# Patient Record
Sex: FEMALE | URBAN - METROPOLITAN AREA
[De-identification: names, ages, dates, MRNs, and addresses within clinical notes are randomized per-mention and may not be internally consistent; named-entity substitution may affect disease eponyms.]

---

## 2023-06-26 ENCOUNTER — TELEPHONE (OUTPATIENT)
Age: 63
End: 2023-06-26

## 2023-06-26 DIAGNOSIS — E78.49 OTHER HYPERLIPIDEMIA: ICD-10-CM

## 2023-06-26 DIAGNOSIS — F41.1 GAD (GENERALIZED ANXIETY DISORDER): ICD-10-CM

## 2023-06-26 DIAGNOSIS — J44.9 STAGE 3 SEVERE COPD BY GOLD CLASSIFICATION (HCC): ICD-10-CM

## 2023-06-26 DIAGNOSIS — J30.2 SEASONAL ALLERGIES: ICD-10-CM

## 2023-06-26 DIAGNOSIS — G47.09 OTHER INSOMNIA: ICD-10-CM

## 2023-06-26 DIAGNOSIS — Z87.891 EX-SMOKER: ICD-10-CM

## 2023-06-26 DIAGNOSIS — M85.80 OSTEOPENIA DETERMINED BY X-RAY: ICD-10-CM

## 2023-06-26 DIAGNOSIS — M19.041 ARTHRITIS OF RIGHT HAND: ICD-10-CM

## 2023-07-09 PROBLEM — E78.49 OTHER HYPERLIPIDEMIA: Status: ACTIVE | Noted: 2023-07-09

## 2023-07-09 PROBLEM — G47.09 OTHER INSOMNIA: Status: ACTIVE | Noted: 2023-07-09

## 2023-07-09 PROBLEM — M85.80 OSTEOPENIA DETERMINED BY X-RAY: Status: ACTIVE | Noted: 2023-07-09

## 2023-07-09 PROBLEM — Z87.891 EX-SMOKER: Status: ACTIVE | Noted: 2023-07-09

## 2023-07-09 PROBLEM — J30.2 SEASONAL ALLERGIES: Status: ACTIVE | Noted: 2023-07-09

## 2023-07-09 PROBLEM — F41.1 GAD (GENERALIZED ANXIETY DISORDER): Status: ACTIVE | Noted: 2023-07-09

## 2023-07-09 PROBLEM — J44.9 STAGE 3 SEVERE COPD BY GOLD CLASSIFICATION (HCC): Status: ACTIVE | Noted: 2023-07-09

## 2023-07-09 PROBLEM — M19.041 ARTHRITIS OF RIGHT HAND: Status: ACTIVE | Noted: 2023-07-09

## 2023-07-09 RX ORDER — MONTELUKAST SODIUM 10 MG/1
10 TABLET ORAL DAILY
COMMUNITY
Start: 2023-05-08

## 2023-07-09 RX ORDER — ALBUTEROL SULFATE 90 UG/1
2 AEROSOL, METERED RESPIRATORY (INHALATION) 2 TIMES DAILY PRN
COMMUNITY
Start: 2023-05-22

## 2023-07-09 RX ORDER — IBUPROFEN 200 MG
2 CAPSULE ORAL DAILY
COMMUNITY

## 2023-07-09 RX ORDER — ROSUVASTATIN CALCIUM 5 MG/1
5 TABLET, COATED ORAL DAILY
COMMUNITY
Start: 2023-06-18

## 2023-07-09 RX ORDER — CHOLECALCIFEROL (VITAMIN D3) 125 MCG
1 CAPSULE ORAL DAILY
COMMUNITY
Start: 2023-05-19

## 2023-07-09 RX ORDER — BUDESONIDE AND FORMOTEROL FUMARATE DIHYDRATE 160; 4.5 UG/1; UG/1
2 AEROSOL RESPIRATORY (INHALATION) 2 TIMES DAILY
COMMUNITY
Start: 2023-06-14

## 2023-07-09 RX ORDER — DIPHENHYDRAMINE HCL 50 MG
50 CAPSULE ORAL NIGHTLY PRN
COMMUNITY

## 2023-07-09 RX ORDER — CELECOXIB 200 MG/1
200 CAPSULE ORAL DAILY PRN
COMMUNITY
Start: 2023-05-08

## 2023-07-09 RX ORDER — TIOTROPIUM BROMIDE INHALATION SPRAY 3.12 UG/1
SPRAY, METERED RESPIRATORY (INHALATION)
COMMUNITY
Start: 2023-05-24

## 2023-07-13 LAB
CHOLESTEROL, TOTAL: 164 MG/DL
CHOLESTEROL/HDL RATIO: 29
HDLC SERPL-MCNC: 57 MG/DL (ref 35–70)
LDL CHOLESTEROL CALCULATED: 85 MG/DL (ref 0–160)
NONHDLC SERPL-MCNC: NORMAL MG/DL
TRIGL SERPL-MCNC: 112 MG/DL
VLDLC SERPL CALC-MCNC: 22 MG/DL

## 2023-07-20 NOTE — PROGRESS NOTES
Please call patient and let her know that her new most recent lipid panel looks improved compared to labs done in May 2023. I like to see her in August for routine follow-up since we started a new med. After this appointment, we can move to spaced out routine appointments.

## 2023-07-20 NOTE — PROGRESS NOTES
Reviewed with patient, verbalized understanding to all above. FYI Dr Ram, patient currently does not have \"good\" insurance and wants to wait until her new insurance kicks in October. She said currently the insurance she has \"sucks\" and does not cover much, she pays a lot out of pocket. If ok with you, she will call back to schedule for an appt for October. Also, for some reason the labs you ordered for CBC, CMP, and magnesium was not done because [facility] did not have the orders (I called lab and the can not add on). Patient state she can't go and get another blood draw because the one she just did cost over $100 to do. She will get that done, if ok by you, sometime in October as well. She appreciates your time and care.

## 2023-09-15 ENCOUNTER — TELEPHONE (OUTPATIENT)
Age: 63
End: 2023-09-15

## 2023-11-02 NOTE — PROGRESS NOTES
date    LUMBAR FUSION      unknown date    TUBAL LIGATION          Social History     Socioeconomic History    Marital status:      Spouse name: None    Number of children: None    Years of education: None    Highest education level: None   Tobacco Use    Smoking status: Former     Packs/day: 1.00     Years: 40.00     Additional pack years: 0.00     Total pack years: 40.00     Types: Cigarettes     Start date: 54     Quit date: 2009     Years since quittin.8    Smokeless tobacco: Never   Vaping Use    Vaping Use: Never used   Substance and Sexual Activity    Alcohol use: Not Currently    Drug use: Never     Social Determinants of Health     Financial Resource Strain: Low Risk  (11/3/2023)    Overall Financial Resource Strain (CARDIA)     Difficulty of Paying Living Expenses: Not hard at all   Food Insecurity: No Food Insecurity (11/3/2023)    Hunger Vital Sign     Worried About Running Out of Food in the Last Year: Never true     Ran Out of Food in the Last Year: Never true   Transportation Needs: No Transportation Needs (11/3/2023)    PRAPARE - Transportation     Lack of Transportation (Medical): No     Lack of Transportation (Non-Medical): No   Physical Activity: Insufficiently Active (11/3/2023)    Exercise Vital Sign     Days of Exercise per Week: 3 days     Minutes of Exercise per Session: 40 min   Stress: No Stress Concern Present (11/3/2023)    [facility]     Feeling of Stress : Not at all   Social Connections:  Moderately Isolated (11/3/2023)    Social Connection and Isolation Panel [NHANES]     Frequency of Communication with Friends and Family: More than three times a week     Frequency of Social Gatherings with Friends and Family: Once a week     Attends [employer] Services: Never     Active Member of Clubs or Organizations: No     Attends Club or Organization Meetings: Never     Marital Status:    Intimate Partner

## 2023-11-03 ENCOUNTER — HOSPITAL ENCOUNTER (OUTPATIENT)
Age: 63
Setting detail: SPECIMEN
Discharge: HOME | End: 2023-11-03

## 2023-11-03 ENCOUNTER — OFFICE VISIT (OUTPATIENT)
Age: 63
End: 2023-11-03
Payer: MEDICARE

## 2023-11-03 VITALS
BODY MASS INDEX: 35.58 KG/M2 | WEIGHT: 208.4 LBS | TEMPERATURE: 98.6 F | HEART RATE: 96 BPM | SYSTOLIC BLOOD PRESSURE: 109 MMHG | HEIGHT: 64 IN | RESPIRATION RATE: 12 BRPM | DIASTOLIC BLOOD PRESSURE: 66 MMHG

## 2023-11-03 DIAGNOSIS — R21 MACULOPAPULAR RASH, LOCALIZED: ICD-10-CM

## 2023-11-03 DIAGNOSIS — E78.2 MIXED HYPERLIPIDEMIA: Primary | ICD-10-CM

## 2023-11-03 DIAGNOSIS — M85.80 OSTEOPENIA DETERMINED BY X-RAY: ICD-10-CM

## 2023-11-03 DIAGNOSIS — L81.4 SOLAR LENTIGO: ICD-10-CM

## 2023-11-03 DIAGNOSIS — E55.9 VITAMIN D DEFICIENCY: ICD-10-CM

## 2023-11-03 DIAGNOSIS — I78.1 TELANGIECTASIAS: ICD-10-CM

## 2023-11-03 DIAGNOSIS — J44.9 STAGE 3 SEVERE COPD BY GOLD CLASSIFICATION (HCC): ICD-10-CM

## 2023-11-03 DIAGNOSIS — Z23 NEED FOR VACCINATION FOR PNEUMOCOCCUS: ICD-10-CM

## 2023-11-03 DIAGNOSIS — Z87.891 FORMER SMOKER: ICD-10-CM

## 2023-11-03 DIAGNOSIS — E66.01 SEVERE OBESITY (BMI 35.0-39.9) WITH COMORBIDITY (HCC): ICD-10-CM

## 2023-11-03 LAB
25(OH)D3 SERPL-MCNC: 89.2 NG/ML
ALBUMIN SERPL-MCNC: 4 G/DL (ref 3.5–5.2)
ALBUMIN/GLOB SERPL: 1.4 {RATIO} (ref 1–2.5)
ALP SERPL-CCNC: 102 U/L (ref 35–104)
ALT SERPL-CCNC: 21 U/L (ref 5–33)
ANION GAP SERPL CALCULATED.3IONS-SCNC: 11 MMOL/L (ref 9–17)
AST SERPL-CCNC: 29 U/L
BASOPHILS # BLD: 0.05 K/UL (ref 0–0.2)
BASOPHILS NFR BLD: 1 % (ref 0–2)
BILIRUB SERPL-MCNC: 0.4 MG/DL (ref 0.3–1.2)
BUN SERPL-MCNC: 14 MG/DL (ref 8–23)
CALCIUM SERPL-MCNC: 9.3 MG/DL (ref 8.6–10.4)
CHLORIDE SERPL-SCNC: 104 MMOL/L (ref 98–107)
CO2 SERPL-SCNC: 25 MMOL/L (ref 20–31)
CREAT SERPL-MCNC: 0.7 MG/DL (ref 0.5–0.9)
EOSINOPHIL # BLD: 0.18 K/UL (ref 0–0.44)
EOSINOPHILS RELATIVE PERCENT: 2 % (ref 1–4)
ERYTHROCYTE [DISTWIDTH] IN BLOOD BY AUTOMATED COUNT: 12.6 % (ref 11.8–14.4)
GFR SERPL CREATININE-BSD FRML MDRD: >60 ML/MIN/1.73M2
GLUCOSE SERPL-MCNC: 91 MG/DL (ref 70–99)
HCT VFR BLD AUTO: 45.1 % (ref 36.3–47.1)
HGB BLD-MCNC: 14.9 G/DL (ref 11.9–15.1)
IMM GRANULOCYTES # BLD AUTO: 0.03 K/UL (ref 0–0.3)
IMM GRANULOCYTES NFR BLD: 0 %
LYMPHOCYTES NFR BLD: 2.05 K/UL (ref 1.1–3.7)
LYMPHOCYTES RELATIVE PERCENT: 21 % (ref 24–43)
MAGNESIUM SERPL-MCNC: 2.2 MG/DL (ref 1.6–2.6)
MCH RBC QN AUTO: 31.1 PG (ref 25.2–33.5)
MCHC RBC AUTO-ENTMCNC: 33 G/DL (ref 28.4–34.8)
MCV RBC AUTO: 94.2 FL (ref 82.6–102.9)
MONOCYTES NFR BLD: 0.79 K/UL (ref 0.1–1.2)
MONOCYTES NFR BLD: 8 % (ref 3–12)
NEUTROPHILS NFR BLD: 68 % (ref 36–65)
NEUTS SEG NFR BLD: 6.49 K/UL (ref 1.5–8.1)
NRBC BLD-RTO: 0 PER 100 WBC
PLATELET # BLD AUTO: 216 K/UL (ref 138–453)
PMV BLD AUTO: 9.2 FL (ref 8.1–13.5)
POTASSIUM SERPL-SCNC: 3.8 MMOL/L (ref 3.7–5.3)
PROT SERPL-MCNC: 6.9 G/DL (ref 6.4–8.3)
RBC # BLD AUTO: 4.79 M/UL (ref 3.95–5.11)
SODIUM SERPL-SCNC: 140 MMOL/L (ref 135–144)
WBC OTHER # BLD: 9.6 K/UL (ref 3.5–11.3)

## 2023-11-03 PROCEDURE — 99211 OFF/OP EST MAY X REQ PHY/QHP: CPT | Performed by: STUDENT IN AN ORGANIZED HEALTH CARE EDUCATION/TRAINING PROGRAM

## 2023-11-03 PROCEDURE — G0009 ADMIN PNEUMOCOCCAL VACCINE: HCPCS | Performed by: STUDENT IN AN ORGANIZED HEALTH CARE EDUCATION/TRAINING PROGRAM

## 2023-11-03 RX ORDER — NYSTATIN 100000 [USP'U]/G
1 POWDER TOPICAL 2 TIMES DAILY
COMMUNITY
Start: 2017-04-07

## 2023-11-03 RX ORDER — IPRATROPIUM BROMIDE AND ALBUTEROL SULFATE 2.5; .5 MG/3ML; MG/3ML
3 SOLUTION RESPIRATORY (INHALATION) DAILY
COMMUNITY
Start: 2019-12-26

## 2023-11-03 RX ORDER — NYSTATIN 100000 U/G
1 CREAM TOPICAL PRN
COMMUNITY
Start: 2020-03-02

## 2023-11-03 RX ORDER — GUAIFENESIN 1200 MG/1
1200 TABLET, EXTENDED RELEASE ORAL DAILY
COMMUNITY
Start: 2019-12-26

## 2023-11-03 SDOH — ECONOMIC STABILITY: INCOME INSECURITY: IN THE LAST 12 MONTHS, WAS THERE A TIME WHEN YOU WERE NOT ABLE TO PAY THE MORTGAGE OR RENT ON TIME?: NO

## 2023-11-03 SDOH — ECONOMIC STABILITY: TRANSPORTATION INSECURITY
IN THE PAST 12 MONTHS, HAS LACK OF TRANSPORTATION KEPT YOU FROM MEETINGS, WORK, OR FROM GETTING THINGS NEEDED FOR DAILY LIVING?: NO

## 2023-11-03 SDOH — ECONOMIC STABILITY: FOOD INSECURITY: WITHIN THE PAST 12 MONTHS, THE FOOD YOU BOUGHT JUST DIDN'T LAST AND YOU DIDN'T HAVE MONEY TO GET MORE.: NEVER TRUE

## 2023-11-03 SDOH — ECONOMIC STABILITY: FOOD INSECURITY: WITHIN THE PAST 12 MONTHS, YOU WORRIED THAT YOUR FOOD WOULD RUN OUT BEFORE YOU GOT MONEY TO BUY MORE.: NEVER TRUE

## 2023-11-03 SDOH — HEALTH STABILITY: PHYSICAL HEALTH: ON AVERAGE, HOW MANY DAYS PER WEEK DO YOU ENGAGE IN MODERATE TO STRENUOUS EXERCISE (LIKE A BRISK WALK)?: 3 DAYS

## 2023-11-03 SDOH — ECONOMIC STABILITY: HOUSING INSECURITY
IN THE LAST 12 MONTHS, WAS THERE A TIME WHEN YOU DID NOT HAVE A STEADY PLACE TO SLEEP OR SLEPT IN A SHELTER (INCLUDING NOW)?: NO

## 2023-11-03 SDOH — ECONOMIC STABILITY: TRANSPORTATION INSECURITY
IN THE PAST 12 MONTHS, HAS THE LACK OF TRANSPORTATION KEPT YOU FROM MEDICAL APPOINTMENTS OR FROM GETTING MEDICATIONS?: NO

## 2023-11-03 SDOH — ECONOMIC STABILITY: HOUSING INSECURITY: IN THE LAST 12 MONTHS, HOW MANY PLACES HAVE YOU LIVED?: 1

## 2023-11-03 SDOH — HEALTH STABILITY: PHYSICAL HEALTH: ON AVERAGE, HOW MANY MINUTES DO YOU ENGAGE IN EXERCISE AT THIS LEVEL?: 40 MIN

## 2023-11-03 SDOH — ECONOMIC STABILITY: INCOME INSECURITY: HOW HARD IS IT FOR YOU TO PAY FOR THE VERY BASICS LIKE FOOD, HOUSING, MEDICAL CARE, AND HEATING?: NOT HARD AT ALL

## 2023-11-03 ASSESSMENT — LIFESTYLE VARIABLES
HOW MANY STANDARD DRINKS CONTAINING ALCOHOL DO YOU HAVE ON A TYPICAL DAY: PATIENT DOES NOT DRINK
HOW OFTEN DO YOU HAVE A DRINK CONTAINING ALCOHOL: NEVER

## 2023-11-03 ASSESSMENT — PATIENT HEALTH QUESTIONNAIRE - PHQ9
SUM OF ALL RESPONSES TO PHQ QUESTIONS 1-9: 0
2. FEELING DOWN, DEPRESSED OR HOPELESS: 0
1. LITTLE INTEREST OR PLEASURE IN DOING THINGS: 0
SUM OF ALL RESPONSES TO PHQ QUESTIONS 1-9: 0
SUM OF ALL RESPONSES TO PHQ9 QUESTIONS 1 & 2: 0

## 2023-11-03 ASSESSMENT — SOCIAL DETERMINANTS OF HEALTH (SDOH)
WITHIN THE LAST YEAR, HAVE YOU BEEN KICKED, HIT, SLAPPED, OR OTHERWISE PHYSICALLY HURT BY YOUR PARTNER OR EX-PARTNER?: NO
WITHIN THE LAST YEAR, HAVE YOU BEEN HUMILIATED OR EMOTIONALLY ABUSED IN OTHER WAYS BY YOUR PARTNER OR EX-PARTNER?: NO
WITHIN THE LAST YEAR, HAVE YOU BEEN AFRAID OF YOUR PARTNER OR EX-PARTNER?: NO
DO YOU BELONG TO ANY CLUBS OR ORGANIZATIONS SUCH AS CHURCH GROUPS UNIONS, FRATERNAL OR ATHLETIC GROUPS, OR SCHOOL GROUPS?: NO
IN A TYPICAL WEEK, HOW MANY TIMES DO YOU TALK ON THE PHONE WITH FAMILY, FRIENDS, OR NEIGHBORS?: MORE THAN THREE TIMES A WEEK
HOW OFTEN DO YOU ATTEND CHURCH OR RELIGIOUS SERVICES?: NEVER
HOW OFTEN DO YOU ATTENT MEETINGS OF THE CLUB OR ORGANIZATION YOU BELONG TO?: NEVER
WITHIN THE LAST YEAR, HAVE TO BEEN RAPED OR FORCED TO HAVE ANY KIND OF SEXUAL ACTIVITY BY YOUR PARTNER OR EX-PARTNER?: NO
HOW OFTEN DO YOU GET TOGETHER WITH FRIENDS OR RELATIVES?: ONCE A WEEK

## 2023-11-03 NOTE — PATIENT INSTRUCTIONS
Thank you for following up with us at [address] [facility]. office! It was a pleasure to meet you today! Our plan is the following:  - I'm going to refer you to a dermatologist for the rash on your chest and the brown spot near your clavicle. If you have not heard from them in 4 business days, you can call the number on the order sheet and schedule an appointment to get established. If you have any issues getting scheduled, please call the office to let me know. - I'm ordering some labwork for you to have done on your way out. I'd like to see you again in about 3mo for routine follow-up. Your medication list is included in the after visit summary. If you find any differences when compared to your medications at home, please contact the office ([phone]) to let us know. You can also call the office if you have any additional questions or concerns and speak to one of the staff. They will triage and forward the message to the provider. Have a great rest of your day!

## 2023-11-17 DIAGNOSIS — E78.49 OTHER HYPERLIPIDEMIA: Primary | ICD-10-CM

## 2023-11-20 RX ORDER — ROSUVASTATIN CALCIUM 5 MG/1
5 TABLET, COATED ORAL DAILY
Qty: 90 TABLET | Refills: 3 | Status: SHIPPED | OUTPATIENT
Start: 2023-11-20 | End: 2024-11-14

## 2023-11-22 PROBLEM — R21 MACULOPAPULAR RASH, LOCALIZED: Status: ACTIVE | Noted: 2023-11-22

## 2023-11-22 PROBLEM — E78.2 MIXED HYPERLIPIDEMIA: Status: ACTIVE | Noted: 2023-07-09

## 2023-11-23 NOTE — ASSESSMENT & PLAN NOTE
- will continue on Crestor 5mg dose  - discussed continuing dietary and behavioral modifications to maintain current levels

## 2023-11-23 NOTE — ASSESSMENT & PLAN NOTE
- telangiectasias noted in rash  - rash without precipitating factor along with large solar lentigo-appearing lesion make dermatology referral next best management option   - sunscreen counseling done today

## 2023-11-23 NOTE — ASSESSMENT & PLAN NOTE
- discussed recent URIs and COPD exacerbations with patient.  Conditions were managed by Pulmonology, but recent illness kept her from following up in office

## 2023-12-08 ENCOUNTER — HOSPITAL ENCOUNTER (OUTPATIENT)
Dept: PREADMISSION TESTING | Age: 63
Discharge: HOME | End: 2023-12-12

## 2023-12-08 VITALS — HEIGHT: 63 IN | BODY MASS INDEX: 36.32 KG/M2 | WEIGHT: 205 LBS

## 2023-12-08 NOTE — PROGRESS NOTES
Pre-op Instructions For Out-Patient Surgery    Medication Instructions:  Please stop herbs and any supplements now (includes vitamins and minerals). Please contact your surgeon and prescribing physician for pre-op instructions for any blood thinners. If you have inhalers/aerosol treatments at home, please use them the morning of your surgery and bring the inhalers with you to the hospital.  Please use Albuterol sulfate, Symbicort, Spiriva, and Budesonide-formoterol inhaler the morning of the procedure and any aerosols that you do in the morning    Please take the following medications the morning of your surgery with a sip of water:    None      Surgery Instructions:  After midnight before surgery:  Do not eat or drink anything, including water, mints, gum, and hard candy. You may brush your teeth without swallowing. No smoking, chewing tobacco, or street drugs. Please shower or bathe before surgery. Please do not wear any cologne, lotion, powder, jewelry, piercings, perfume, makeup, nail polish, hair accessories, or hair spray on the day of surgery. Wear loose comfortable clothing. Leave your valuables at home but bring a payment source for any after-surgery prescriptions you plan to fill at [facility]. Bring a storage case for any glasses/contacts. An adult who is responsible for you MUST drive you home and should be with you for the first 24 hours after surgery. The Day of Surgery:  Arrive at [facility] AT [facility] Surgery Entrance at the time directed by your surgeon and check in at the desk. If you have a living will or healthcare power of , please bring a copy. You will be taken to the pre-op holding area where you will be prepared for surgery. A physical assessment will be performed by a nurse practitioner or house officer.   Your IV will be started and you will meet your anesthesiologist.    When you go to surgery, your family will be directed to the

## 2023-12-12 ENCOUNTER — ANESTHESIA EVENT (OUTPATIENT)
Dept: OPERATING ROOM | Age: 63
End: 2023-12-12
Payer: MEDICARE

## 2023-12-13 ENCOUNTER — HOSPITAL ENCOUNTER (OUTPATIENT)
Age: 63
Setting detail: OUTPATIENT SURGERY
Discharge: HOME | End: 2023-12-13
Attending: OPHTHALMOLOGY | Admitting: OPHTHALMOLOGY
Payer: MEDICARE

## 2023-12-13 ENCOUNTER — ANESTHESIA (OUTPATIENT)
Dept: OPERATING ROOM | Age: 63
End: 2023-12-13
Payer: MEDICARE

## 2023-12-13 VITALS
OXYGEN SATURATION: 95 % | WEIGHT: 205 LBS | TEMPERATURE: 97.1 F | HEIGHT: 63 IN | HEART RATE: 77 BPM | RESPIRATION RATE: 14 BRPM | BODY MASS INDEX: 36.32 KG/M2 | SYSTOLIC BLOOD PRESSURE: 116 MMHG | DIASTOLIC BLOOD PRESSURE: 56 MMHG

## 2023-12-13 PROCEDURE — 3700000000 HC ANESTHESIA ATTENDED CARE: Performed by: OPHTHALMOLOGY

## 2023-12-13 PROCEDURE — 7100000011 HC PHASE II RECOVERY - ADDTL 15 MIN: Performed by: OPHTHALMOLOGY

## 2023-12-13 PROCEDURE — 6370000000 HC RX 637 (ALT 250 FOR IP): Performed by: OPHTHALMOLOGY

## 2023-12-13 PROCEDURE — 6360000002 HC RX W HCPCS: Performed by: OPHTHALMOLOGY

## 2023-12-13 PROCEDURE — 2500000003 HC RX 250 WO HCPCS: Performed by: ANESTHESIOLOGY

## 2023-12-13 PROCEDURE — 3700000001 HC ADD 15 MINUTES (ANESTHESIA): Performed by: OPHTHALMOLOGY

## 2023-12-13 PROCEDURE — 6360000002 HC RX W HCPCS: Performed by: NURSE ANESTHETIST, CERTIFIED REGISTERED

## 2023-12-13 PROCEDURE — V2632 POST CHMBR INTRAOCULAR LENS: HCPCS | Performed by: OPHTHALMOLOGY

## 2023-12-13 PROCEDURE — 3600000002 HC SURGERY LEVEL 2 BASE: Performed by: OPHTHALMOLOGY

## 2023-12-13 PROCEDURE — 3600000012 HC SURGERY LEVEL 2 ADDTL 15MIN: Performed by: OPHTHALMOLOGY

## 2023-12-13 PROCEDURE — 2500000003 HC RX 250 WO HCPCS: Performed by: OPHTHALMOLOGY

## 2023-12-13 PROCEDURE — 7100000010 HC PHASE II RECOVERY - FIRST 15 MIN: Performed by: OPHTHALMOLOGY

## 2023-12-13 PROCEDURE — 2709999900 HC NON-CHARGEABLE SUPPLY: Performed by: OPHTHALMOLOGY

## 2023-12-13 DEVICE — LENS CLAREON IOL 21.0D: Type: IMPLANTABLE DEVICE | Site: EYE | Status: FUNCTIONAL

## 2023-12-13 RX ORDER — KETOROLAC TROMETHAMINE 5 MG/ML
1 SOLUTION OPHTHALMIC ONCE
Status: COMPLETED | OUTPATIENT
Start: 2023-12-13 | End: 2023-12-13

## 2023-12-13 RX ORDER — SODIUM CHLORIDE 9 MG/ML
INJECTION, SOLUTION INTRAVENOUS PRN
Status: DISCONTINUED | OUTPATIENT
Start: 2023-12-13 | End: 2023-12-13 | Stop reason: HOSPADM

## 2023-12-13 RX ORDER — SODIUM CHLORIDE 0.9 % (FLUSH) 0.9 %
5-40 SYRINGE (ML) INJECTION PRN
Status: DISCONTINUED | OUTPATIENT
Start: 2023-12-13 | End: 2023-12-13 | Stop reason: HOSPADM

## 2023-12-13 RX ORDER — SODIUM CHLORIDE 0.9 % (FLUSH) 0.9 %
5-40 SYRINGE (ML) INJECTION EVERY 12 HOURS SCHEDULED
Status: DISCONTINUED | OUTPATIENT
Start: 2023-12-13 | End: 2023-12-13 | Stop reason: HOSPADM

## 2023-12-13 RX ORDER — CYCLOPENTOLATE HYDROCHLORIDE 10 MG/ML
1 SOLUTION/ DROPS OPHTHALMIC PRN
Status: COMPLETED | OUTPATIENT
Start: 2023-12-13 | End: 2023-12-13

## 2023-12-13 RX ORDER — CIPROFLOXACIN HYDROCHLORIDE 3.5 MG/ML
SOLUTION/ DROPS TOPICAL PRN
Status: DISCONTINUED | OUTPATIENT
Start: 2023-12-13 | End: 2023-12-13 | Stop reason: ALTCHOICE

## 2023-12-13 RX ORDER — TETRACAINE HYDROCHLORIDE 5 MG/ML
SOLUTION OPHTHALMIC PRN
Status: DISCONTINUED | OUTPATIENT
Start: 2023-12-13 | End: 2023-12-13 | Stop reason: ALTCHOICE

## 2023-12-13 RX ORDER — PROPARACAINE HYDROCHLORIDE 5 MG/ML
1 SOLUTION/ DROPS OPHTHALMIC EVERY 5 MIN PRN
Status: DISCONTINUED | OUTPATIENT
Start: 2023-12-13 | End: 2023-12-13 | Stop reason: HOSPADM

## 2023-12-13 RX ORDER — BALANCED SALT SOLUTION ENRICHED WITH BICARBONATE, DEXTROSE, AND GLUTATHIONE
KIT INTRAOCULAR PRN
Status: DISCONTINUED | OUTPATIENT
Start: 2023-12-13 | End: 2023-12-13 | Stop reason: ALTCHOICE

## 2023-12-13 RX ORDER — LIDOCAINE HYDROCHLORIDE 10 MG/ML
INJECTION, SOLUTION EPIDURAL; INFILTRATION; INTRACAUDAL; PERINEURAL PRN
Status: DISCONTINUED | OUTPATIENT
Start: 2023-12-13 | End: 2023-12-13 | Stop reason: ALTCHOICE

## 2023-12-13 RX ORDER — PREDNISOLONE ACETATE 10 MG/ML
SUSPENSION/ DROPS OPHTHALMIC PRN
Status: DISCONTINUED | OUTPATIENT
Start: 2023-12-13 | End: 2023-12-13 | Stop reason: ALTCHOICE

## 2023-12-13 RX ORDER — MIDAZOLAM HYDROCHLORIDE 1 MG/ML
INJECTION INTRAMUSCULAR; INTRAVENOUS PRN
Status: DISCONTINUED | OUTPATIENT
Start: 2023-12-13 | End: 2023-12-13 | Stop reason: SDUPTHER

## 2023-12-13 RX ORDER — LIDOCAINE HYDROCHLORIDE 10 MG/ML
1 INJECTION, SOLUTION EPIDURAL; INFILTRATION; INTRACAUDAL; PERINEURAL
Status: COMPLETED | OUTPATIENT
Start: 2023-12-13 | End: 2023-12-13

## 2023-12-13 RX ORDER — SODIUM CHLORIDE 9 MG/ML
INJECTION, SOLUTION INTRAVENOUS CONTINUOUS
Status: DISCONTINUED | OUTPATIENT
Start: 2023-12-13 | End: 2023-12-13 | Stop reason: HOSPADM

## 2023-12-13 RX ADMIN — Medication 1 DROP: at 09:44

## 2023-12-13 RX ADMIN — Medication 1 DROP: at 09:38

## 2023-12-13 RX ADMIN — CYCLOPENTOLATE HYDROCHLORIDE 1 DROP: 10 SOLUTION OPHTHALMIC at 09:39

## 2023-12-13 RX ADMIN — MIDAZOLAM 1 MG: 1 INJECTION INTRAMUSCULAR; INTRAVENOUS at 11:17

## 2023-12-13 RX ADMIN — LIDOCAINE HYDROCHLORIDE 1 ML: 10 INJECTION, SOLUTION EPIDURAL; INFILTRATION; INTRACAUDAL; PERINEURAL at 09:55

## 2023-12-13 RX ADMIN — CYCLOPENTOLATE HYDROCHLORIDE 1 DROP: 10 SOLUTION OPHTHALMIC at 09:44

## 2023-12-13 RX ADMIN — Medication 1 DROP: at 09:53

## 2023-12-13 RX ADMIN — CYCLOPENTOLATE HYDROCHLORIDE 1 DROP: 10 SOLUTION OPHTHALMIC at 09:53

## 2023-12-13 RX ADMIN — KETOROLAC TROMETHAMINE 1 DROP: 5 SOLUTION/ DROPS OPHTHALMIC at 09:39

## 2023-12-13 ASSESSMENT — PAIN - FUNCTIONAL ASSESSMENT
PAIN_FUNCTIONAL_ASSESSMENT: NONE - DENIES PAIN
PAIN_FUNCTIONAL_ASSESSMENT: 0-10

## 2023-12-13 NOTE — ANESTHESIA PRE PROCEDURE
Department of Anesthesiology  Preprocedure Note       Name:  Nataliya Search   Age:  70 y.o.  :  1960                                          MRN:  [id number]         Date:  2023      Surgeon: Derick Hood):  Del Zabala MD    Procedure: Procedure(s):  EYE CATARACT EMULSIFICATION IOL IMPLANT  LEFT    Medications prior to admission:   Prior to Admission medications    Medication Sig Start Date End Date Taking?  Authorizing Provider   rosuvastatin (CRESTOR) 5 MG tablet Take 1 tablet by mouth daily 23  Vesna Ram MD   guaiFENesin 1200 MG TB12 Take 1,200 mg by mouth daily 19   Kamila Shaw MD   ipratropium 0.5 mg-albuterol 2.5 mg (DUONEB) 0.5-2.5 (3) MG/3ML SOLN nebulizer solution Inhale 3 mLs into the lungs daily 19   Kamila Shaw MD   nystatin (MYCOSTATIN) 939443 UNIT/GM powder Apply 1 Application topically 2 times daily 17   Kamila Shaw MD   nystatin (MYCOSTATIN) 729739 UNIT/GM cream Apply 1 Application topically as needed 3/2/20   Kamila Shaw MD   celecoxib (CELEBREX) 200 MG capsule Take 1 capsule by mouth daily as needed 23   Kamila Shaw MD   montelukast (SINGULAIR) 10 MG tablet Take 1 tablet by mouth daily 23   Zhang Garvey MD   D-3-5 125 MCG (5000 UT) CAPS capsule Take 1 capsule by mouth daily 23   Kamila Shaw MD   albuterol sulfate HFA (PROVENTIL;VENTOLIN;PROAIR) 108 (90 Base) MCG/ACT inhaler Inhale 2 puffs into the lungs 2 times daily as needed 23   Zhang Garvey MD   budesonide-formoterol [facility]) 160-4.5 MCG/ACT AERO Inhale 2 puffs into the lungs 2 times daily 23   Zhang Garvey MD   SPIRIVA RESPIMAT 2.5 MCG/ACT AERS inhaler INHALE 2 PUFFS BY MOUTH EVERY DAY 23   Zhang Garvey MD   diphenhydrAMINE (BENADRYL) 50 MG capsule Take 1 capsule by mouth nightly as needed for Sleep    Provider, MD Kamila   calcium carbonate (OYSTER SHELL CALCIUM 500 MG) 1250 (500 Ca) MG

## 2023-12-13 NOTE — OP NOTE
Operative Note      Patient: Ritu De León  YOB: 1960  MRN: [id number]    Date of Procedure: 12/13/2023    Pre-Op Diagnosis Codes:     * Cataract of left eye, unspecified cataract type [H26.9]    Post-Op Diagnosis: Same       Procedure(s):  EYE CATARACT EMULSIFICATION IOL IMPLANT  LEFT    Surgeon(s):  Forrest Galeano MD    Assistant:   * No surgical staff found *    Anesthesia: Monitor Anesthesia Care    Estimated Blood Loss (mL): Minimal    Complications: None    Specimens:   * No specimens in log *    Implants:  Implant Name Type Inv. Item Serial No.  Lot No. LRB No. Used Action   LENS CLAREON IOL 21.0D - [id number]  [address] [facility] Msb 21.0D [id number] [address] [facility] [employer]  Left 1 Implanted         Drains: * No LDAs found *    Findings: Cataract        Detailed Description of Procedure: This is a 70-year-old woman having increasing difficulty with blurred vision in her left eye having difficulty driving especially at night due to glare best corrected vision in her left eye is 2030 with bright light testing she drops to 20/150 in her left eye she has 3+ NS 1+ cortical cataract appears to understand the risk benefits alternatives of cataract surgery in her left eye and wishes to proceedThe patient was brought back into the operating suite, placed in supine position, prepped and draped in usual standard fashion. A lid speculum was placed into the left eye. Approximately 0.3 cc of plain nonpreserved lidocaine was placed onto the eye topically. A paracentesis tract was made at the 2 o'clock position with an eye knife. Then, 0.3 cc of 1% non-preserved lidocaine was placed into the anterior chamber. This was then replaced with Viscoat. Attention was directed superiorly. A conjunctival peritomy was carried out from the 11 o'clock to 1 o'clock position. Hemostasis was obtained with wet-field cautery.  A scratch incision was made at the posterior border of the limbus for approximately

## 2023-12-13 NOTE — H&P
HISTORY and [address]       NAME:  Kavon Weinstein  MRN: [id number]   YOB: 1960   Date: 2023   Age: 70 y.o. Gender: female       COMPLAINT AND PRESENT HISTORY:       Kavon Weinstein is 70 y.o.,  female, here for . Pre-Op Diagnosis Codes:     * Cataract of left eye, unspecified cataract type    Pt will be having  EYE CATARACT EMULSIFICATION IOL IMPLANT  LEFTdone today    Pt denies any previous eye surgery. Using eye gtts as prescribed. Pt admits  blurred / cloudy vision. Pt denies any  eye pain, redness, drainage . Pt has hx of dry eye and uses eye gtts. Pt is wearing corrective lens / contacts. Pt denies family history of glaucoma or blindness. but has FH of macular degeneration    Medical history reviewed. Pt denies recent or current chest pain/pressure, palpitations, SOB, recent URI, fever or chills. NPO p MN. Took nebulizer, Spriva and Symbicort  this am .     Pt is not on any  blood thinning medications.        PAST MEDICAL HISTORY     Past Medical History:   Diagnosis Date    Anxiety     Emphysema of lung ([address])     Hyperlipidemia     Obesity     Osteoarthritis        SURGICAL HISTORY       Past Surgical History:   Procedure Laterality Date    COLONOSCOPY      unknown date    LUMBAR FUSION      unknown date    TUBAL LIGATION         FAMILY HISTORY       Family History   Problem Relation Age of Onset    Stroke Mother     Arthritis Mother     Arthritis Father        SOCIAL HISTORY       Social History     Socioeconomic History    Marital status:    Tobacco Use    Smoking status: Former     Packs/day: 1.00     Years: 40.00     Additional pack years: 0.00     Total pack years: 40.00     Types: Cigarettes     Start date: 54     Quit date: 2009     Years since quittin.9    Smokeless tobacco: Never   Vaping Use    Vaping Use: Never used   Substance and Sexual Activity    Alcohol use: Not Currently    Drug use: Never

## 2024-01-10 ENCOUNTER — HOSPITAL ENCOUNTER (OUTPATIENT)
Age: 64
Discharge: HOME | End: 2024-01-10
Payer: MEDICARE

## 2024-01-10 LAB
ANION GAP SERPL CALCULATED.3IONS-SCNC: 10 MMOL/L (ref 9–16)
BUN SERPL-MCNC: 14 MG/DL (ref 8–23)
CALCIUM SERPL-MCNC: 9 MG/DL (ref 8.6–10.4)
CHLORIDE SERPL-SCNC: 106 MMOL/L (ref 98–107)
CO2 SERPL-SCNC: 26 MMOL/L (ref 20–31)
CREAT SERPL-MCNC: 0.8 MG/DL (ref 0.5–0.9)
ERYTHROCYTE [DISTWIDTH] IN BLOOD BY AUTOMATED COUNT: 12.4 % (ref 11.8–14.4)
GFR SERPL CREATININE-BSD FRML MDRD: >60 ML/MIN/1.73M2
GLUCOSE SERPL-MCNC: 96 MG/DL (ref 74–99)
HCT VFR BLD AUTO: 45.8 % (ref 36.3–47.1)
HGB BLD-MCNC: 15.1 G/DL (ref 11.9–15.1)
MCH RBC QN AUTO: 30.8 PG (ref 25.2–33.5)
MCHC RBC AUTO-ENTMCNC: 33 G/DL (ref 28.4–34.8)
MCV RBC AUTO: 93.3 FL (ref 82.6–102.9)
NRBC BLD-RTO: 0 PER 100 WBC
PLATELET # BLD AUTO: 225 K/UL (ref 138–453)
PMV BLD AUTO: 8.9 FL (ref 8.1–13.5)
POTASSIUM SERPL-SCNC: 4 MMOL/L (ref 3.7–5.3)
RBC # BLD AUTO: 4.91 M/UL (ref 3.95–5.11)
SODIUM SERPL-SCNC: 142 MMOL/L (ref 136–145)
WBC OTHER # BLD: 9.4 K/UL (ref 3.5–11.3)

## 2024-01-10 PROCEDURE — 85027 COMPLETE CBC AUTOMATED: CPT

## 2024-01-10 PROCEDURE — 36415 COLL VENOUS BLD VENIPUNCTURE: CPT

## 2024-01-10 PROCEDURE — 80048 BASIC METABOLIC PNL TOTAL CA: CPT

## 2024-01-11 LAB
EKG ATRIAL RATE: 87 BPM
EKG P AXIS: 61 DEGREES
EKG P-R INTERVAL: 160 MS
EKG Q-T INTERVAL: 394 MS
EKG QRS DURATION: 96 MS
EKG QTC CALCULATION (BAZETT): 474 MS
EKG R AXIS: 45 DEGREES
EKG T AXIS: 65 DEGREES
EKG VENTRICULAR RATE: 87 BPM

## 2024-01-11 NOTE — PROGRESS NOTES
Preoperative Instructions:    Stop eating solid foods at midnight the night prior to your surgery.     Stop drinking clear liquids at midnight the night prior to your surgery.    Arrive at the surgery center (3rd entrance) on _____[id number]__________ by __1200_____________.     Please stop any blood thinning medications as directed by your surgeon or prescribing physician. Failure to stop certain medications may interfere with your scheduled surgery. These may include: Aspirin, Coumadin, Plavix, NSAIDS (Motrin, Aleve, Advil, Mobic, Celebrex), Eliquis, Pradaxa, Xarelto, Fish oil, and herbal supplements.     You may continue the rest of your medications through the night before surgery unless instructed otherwise.     Day of surgery please take only the following medication(s) with a small sip of water: None  Please shower with antibacterial soap and water the day before and the morning the day of surgery.    Please use and bring inhalers the day of surgery. Spirivia, Duoneb nebulizer, Symbicort, ALbuterol      Reminders:  -If you are going home the day of your procedure, you will need a family member or friend to stay during the procedure and drive you home after your procedure. Your  must be 18 years of age or older and able to sign off on your discharge instructions.    -If you are going home the same day of your surgery, someone must remain with you for the first 24 hours after your surgery if you receive sedation or anesthesia.     -Please do not wear any jewelery , lotions, contacts or body piercing the day of surgery

## 2024-01-12 ENCOUNTER — ANESTHESIA EVENT (OUTPATIENT)
Dept: OPERATING ROOM | Age: 64
End: 2024-01-12
Payer: MEDICARE

## 2024-01-19 ENCOUNTER — ANESTHESIA (OUTPATIENT)
Dept: OPERATING ROOM | Age: 64
End: 2024-01-19
Payer: MEDICARE

## 2024-01-19 ENCOUNTER — HOSPITAL ENCOUNTER (OUTPATIENT)
Age: 64
Setting detail: OUTPATIENT SURGERY
Discharge: HOME | End: 2024-01-19
Attending: OBSTETRICS & GYNECOLOGY | Admitting: OBSTETRICS & GYNECOLOGY
Payer: MEDICARE

## 2024-01-19 VITALS
DIASTOLIC BLOOD PRESSURE: 69 MMHG | SYSTOLIC BLOOD PRESSURE: 129 MMHG | HEIGHT: 63 IN | TEMPERATURE: 97.2 F | OXYGEN SATURATION: 91 % | RESPIRATION RATE: 11 BRPM | BODY MASS INDEX: 37.14 KG/M2 | WEIGHT: 209.6 LBS | HEART RATE: 66 BPM

## 2024-01-19 DIAGNOSIS — N89.8 VAGINAL LESION: ICD-10-CM

## 2024-01-19 DIAGNOSIS — N90.89 LESION OF VULVA: ICD-10-CM

## 2024-01-19 PROCEDURE — 2709999900 HC NON-CHARGEABLE SUPPLY: Performed by: OBSTETRICS & GYNECOLOGY

## 2024-01-19 PROCEDURE — 88342 IMHCHEM/IMCYTCHM 1ST ANTB: CPT

## 2024-01-19 PROCEDURE — 88305 TISSUE EXAM BY PATHOLOGIST: CPT

## 2024-01-19 PROCEDURE — 6360000002 HC RX W HCPCS

## 2024-01-19 PROCEDURE — 6370000000 HC RX 637 (ALT 250 FOR IP): Performed by: OBSTETRICS & GYNECOLOGY

## 2024-01-19 PROCEDURE — 6360000002 HC RX W HCPCS: Performed by: NURSE ANESTHETIST, CERTIFIED REGISTERED

## 2024-01-19 PROCEDURE — 2580000003 HC RX 258: Performed by: ANESTHESIOLOGY

## 2024-01-19 PROCEDURE — 3600000003 HC SURGERY LEVEL 3 BASE: Performed by: OBSTETRICS & GYNECOLOGY

## 2024-01-19 PROCEDURE — 7100000000 HC PACU RECOVERY - FIRST 15 MIN: Performed by: OBSTETRICS & GYNECOLOGY

## 2024-01-19 PROCEDURE — 7100000001 HC PACU RECOVERY - ADDTL 15 MIN: Performed by: OBSTETRICS & GYNECOLOGY

## 2024-01-19 PROCEDURE — 3700000001 HC ADD 15 MINUTES (ANESTHESIA): Performed by: OBSTETRICS & GYNECOLOGY

## 2024-01-19 PROCEDURE — 2580000003 HC RX 258: Performed by: NURSE ANESTHETIST, CERTIFIED REGISTERED

## 2024-01-19 PROCEDURE — 3600000013 HC SURGERY LEVEL 3 ADDTL 15MIN: Performed by: OBSTETRICS & GYNECOLOGY

## 2024-01-19 PROCEDURE — 7100000010 HC PHASE II RECOVERY - FIRST 15 MIN: Performed by: OBSTETRICS & GYNECOLOGY

## 2024-01-19 PROCEDURE — 7100000011 HC PHASE II RECOVERY - ADDTL 15 MIN: Performed by: OBSTETRICS & GYNECOLOGY

## 2024-01-19 PROCEDURE — 88341 IMHCHEM/IMCYTCHM EA ADD ANTB: CPT

## 2024-01-19 PROCEDURE — 2500000003 HC RX 250 WO HCPCS: Performed by: NURSE ANESTHETIST, CERTIFIED REGISTERED

## 2024-01-19 PROCEDURE — 3700000000 HC ANESTHESIA ATTENDED CARE: Performed by: OBSTETRICS & GYNECOLOGY

## 2024-01-19 RX ORDER — SODIUM CHLORIDE 9 MG/ML
INJECTION, SOLUTION INTRAVENOUS PRN
Status: DISCONTINUED | OUTPATIENT
Start: 2024-01-19 | End: 2024-01-19 | Stop reason: HOSPADM

## 2024-01-19 RX ORDER — LABETALOL HYDROCHLORIDE 5 MG/ML
10 INJECTION, SOLUTION INTRAVENOUS
Status: DISCONTINUED | OUTPATIENT
Start: 2024-01-19 | End: 2024-01-19 | Stop reason: HOSPADM

## 2024-01-19 RX ORDER — PYRANTEL PAMOATE 100 %
POWDER (GRAM) MISCELLANEOUS PRN
Status: DISCONTINUED | OUTPATIENT
Start: 2024-01-19 | End: 2024-01-19 | Stop reason: ALTCHOICE

## 2024-01-19 RX ORDER — HYDRALAZINE HYDROCHLORIDE 20 MG/ML
10 INJECTION INTRAMUSCULAR; INTRAVENOUS
Status: DISCONTINUED | OUTPATIENT
Start: 2024-01-19 | End: 2024-01-19 | Stop reason: HOSPADM

## 2024-01-19 RX ORDER — MORPHINE SULFATE 2 MG/ML
INJECTION, SOLUTION INTRAMUSCULAR; INTRAVENOUS
Status: COMPLETED
Start: 2024-01-19 | End: 2024-01-19

## 2024-01-19 RX ORDER — PROMETHAZINE HYDROCHLORIDE 25 MG/ML
6.25 INJECTION, SOLUTION INTRAMUSCULAR; INTRAVENOUS EVERY 5 MIN PRN
Status: DISCONTINUED | OUTPATIENT
Start: 2024-01-19 | End: 2024-01-19 | Stop reason: HOSPADM

## 2024-01-19 RX ORDER — MORPHINE SULFATE 2 MG/ML
2 INJECTION, SOLUTION INTRAMUSCULAR; INTRAVENOUS EVERY 5 MIN PRN
Status: DISCONTINUED | OUTPATIENT
Start: 2024-01-19 | End: 2024-01-19 | Stop reason: HOSPADM

## 2024-01-19 RX ORDER — SODIUM CHLORIDE, SODIUM LACTATE, POTASSIUM CHLORIDE, CALCIUM CHLORIDE 600; 310; 30; 20 MG/100ML; MG/100ML; MG/100ML; MG/100ML
INJECTION, SOLUTION INTRAVENOUS CONTINUOUS
Status: DISCONTINUED | OUTPATIENT
Start: 2024-01-19 | End: 2024-01-19 | Stop reason: HOSPADM

## 2024-01-19 RX ORDER — ACETIC ACID 5 %
LIQUID (ML) MISCELLANEOUS PRN
Status: DISCONTINUED | OUTPATIENT
Start: 2024-01-19 | End: 2024-01-19 | Stop reason: ALTCHOICE

## 2024-01-19 RX ORDER — MIDAZOLAM HYDROCHLORIDE 2 MG/2ML
2 INJECTION, SOLUTION INTRAMUSCULAR; INTRAVENOUS
Status: DISCONTINUED | OUTPATIENT
Start: 2024-01-19 | End: 2024-01-19 | Stop reason: HOSPADM

## 2024-01-19 RX ORDER — MEPERIDINE HYDROCHLORIDE 50 MG/ML
12.5 INJECTION INTRAMUSCULAR; INTRAVENOUS; SUBCUTANEOUS EVERY 5 MIN PRN
Status: DISCONTINUED | OUTPATIENT
Start: 2024-01-19 | End: 2024-01-19 | Stop reason: HOSPADM

## 2024-01-19 RX ORDER — GLYCOPYRROLATE 0.2 MG/ML
0.4 INJECTION INTRAMUSCULAR; INTRAVENOUS ONCE
Status: DISCONTINUED | OUTPATIENT
Start: 2024-01-19 | End: 2024-01-19 | Stop reason: HOSPADM

## 2024-01-19 RX ORDER — IPRATROPIUM BROMIDE AND ALBUTEROL SULFATE 2.5; .5 MG/3ML; MG/3ML
1 SOLUTION RESPIRATORY (INHALATION)
Status: DISCONTINUED | OUTPATIENT
Start: 2024-01-19 | End: 2024-01-19 | Stop reason: HOSPADM

## 2024-01-19 RX ORDER — LIDOCAINE HYDROCHLORIDE 10 MG/ML
1 INJECTION, SOLUTION EPIDURAL; INFILTRATION; INTRACAUDAL; PERINEURAL
Status: DISCONTINUED | OUTPATIENT
Start: 2024-01-19 | End: 2024-01-19 | Stop reason: HOSPADM

## 2024-01-19 RX ORDER — FERRIC SUBSULFATE 0.21 G/G
LIQUID TOPICAL PRN
Status: DISCONTINUED | OUTPATIENT
Start: 2024-01-19 | End: 2024-01-19 | Stop reason: ALTCHOICE

## 2024-01-19 RX ORDER — SODIUM CHLORIDE, SODIUM LACTATE, POTASSIUM CHLORIDE, CALCIUM CHLORIDE 600; 310; 30; 20 MG/100ML; MG/100ML; MG/100ML; MG/100ML
INJECTION, SOLUTION INTRAVENOUS CONTINUOUS PRN
Status: DISCONTINUED | OUTPATIENT
Start: 2024-01-19 | End: 2024-01-19 | Stop reason: SDUPTHER

## 2024-01-19 RX ORDER — ONDANSETRON 2 MG/ML
INJECTION INTRAMUSCULAR; INTRAVENOUS PRN
Status: DISCONTINUED | OUTPATIENT
Start: 2024-01-19 | End: 2024-01-19 | Stop reason: SDUPTHER

## 2024-01-19 RX ORDER — PROPOFOL 10 MG/ML
INJECTION, EMULSION INTRAVENOUS PRN
Status: DISCONTINUED | OUTPATIENT
Start: 2024-01-19 | End: 2024-01-19 | Stop reason: SDUPTHER

## 2024-01-19 RX ORDER — ONDANSETRON 2 MG/ML
4 INJECTION INTRAMUSCULAR; INTRAVENOUS
Status: DISCONTINUED | OUTPATIENT
Start: 2024-01-19 | End: 2024-01-19 | Stop reason: HOSPADM

## 2024-01-19 RX ORDER — DIPHENHYDRAMINE HYDROCHLORIDE 50 MG/ML
12.5 INJECTION INTRAMUSCULAR; INTRAVENOUS
Status: DISCONTINUED | OUTPATIENT
Start: 2024-01-19 | End: 2024-01-19 | Stop reason: HOSPADM

## 2024-01-19 RX ORDER — DEXAMETHASONE SODIUM PHOSPHATE 10 MG/ML
INJECTION, SOLUTION INTRAMUSCULAR; INTRAVENOUS PRN
Status: DISCONTINUED | OUTPATIENT
Start: 2024-01-19 | End: 2024-01-19 | Stop reason: SDUPTHER

## 2024-01-19 RX ORDER — MIDAZOLAM HYDROCHLORIDE 1 MG/ML
INJECTION INTRAMUSCULAR; INTRAVENOUS PRN
Status: DISCONTINUED | OUTPATIENT
Start: 2024-01-19 | End: 2024-01-19 | Stop reason: SDUPTHER

## 2024-01-19 RX ORDER — LIDOCAINE HYDROCHLORIDE 10 MG/ML
INJECTION, SOLUTION INFILTRATION; PERINEURAL PRN
Status: DISCONTINUED | OUTPATIENT
Start: 2024-01-19 | End: 2024-01-19 | Stop reason: SDUPTHER

## 2024-01-19 RX ORDER — OXYCODONE HYDROCHLORIDE AND ACETAMINOPHEN 5; 325 MG/1; MG/1
1 TABLET ORAL
Status: DISCONTINUED | OUTPATIENT
Start: 2024-01-19 | End: 2024-01-19 | Stop reason: HOSPADM

## 2024-01-19 RX ORDER — SODIUM CHLORIDE 0.9 % (FLUSH) 0.9 %
5-40 SYRINGE (ML) INJECTION EVERY 12 HOURS SCHEDULED
Status: DISCONTINUED | OUTPATIENT
Start: 2024-01-19 | End: 2024-01-19 | Stop reason: HOSPADM

## 2024-01-19 RX ORDER — FENTANYL CITRATE 50 UG/ML
INJECTION, SOLUTION INTRAMUSCULAR; INTRAVENOUS PRN
Status: DISCONTINUED | OUTPATIENT
Start: 2024-01-19 | End: 2024-01-19 | Stop reason: SDUPTHER

## 2024-01-19 RX ORDER — METOCLOPRAMIDE HYDROCHLORIDE 5 MG/ML
10 INJECTION INTRAMUSCULAR; INTRAVENOUS
Status: DISCONTINUED | OUTPATIENT
Start: 2024-01-19 | End: 2024-01-19 | Stop reason: HOSPADM

## 2024-01-19 RX ORDER — OXYCODONE HYDROCHLORIDE AND ACETAMINOPHEN 5; 325 MG/1; MG/1
2 TABLET ORAL
Status: DISCONTINUED | OUTPATIENT
Start: 2024-01-19 | End: 2024-01-19 | Stop reason: HOSPADM

## 2024-01-19 RX ORDER — SODIUM CHLORIDE 0.9 % (FLUSH) 0.9 %
5-40 SYRINGE (ML) INJECTION PRN
Status: DISCONTINUED | OUTPATIENT
Start: 2024-01-19 | End: 2024-01-19 | Stop reason: HOSPADM

## 2024-01-19 RX ADMIN — MORPHINE SULFATE 2 MG: 2 INJECTION, SOLUTION INTRAMUSCULAR; INTRAVENOUS at 12:46

## 2024-01-19 RX ADMIN — FENTANYL CITRATE 100 MCG: 50 INJECTION, SOLUTION INTRAMUSCULAR; INTRAVENOUS at 11:35

## 2024-01-19 RX ADMIN — PROPOFOL 150 MG: 10 INJECTION, EMULSION INTRAVENOUS at 11:35

## 2024-01-19 RX ADMIN — ONDANSETRON 4 MG: 2 INJECTION INTRAMUSCULAR; INTRAVENOUS at 11:41

## 2024-01-19 RX ADMIN — SODIUM CHLORIDE, POTASSIUM CHLORIDE, SODIUM LACTATE AND CALCIUM CHLORIDE: 600; 310; 30; 20 INJECTION, SOLUTION INTRAVENOUS at 11:35

## 2024-01-19 RX ADMIN — LIDOCAINE HYDROCHLORIDE 40 MG: 10 INJECTION, SOLUTION INFILTRATION; PERINEURAL at 11:35

## 2024-01-19 RX ADMIN — DEXAMETHASONE SODIUM PHOSPHATE 10 MG: 10 INJECTION INTRAMUSCULAR; INTRAVENOUS at 11:41

## 2024-01-19 RX ADMIN — MIDAZOLAM 2 MG: 1 INJECTION INTRAMUSCULAR; INTRAVENOUS at 11:35

## 2024-01-19 RX ADMIN — SODIUM CHLORIDE, POTASSIUM CHLORIDE, SODIUM LACTATE AND CALCIUM CHLORIDE: 600; 310; 30; 20 INJECTION, SOLUTION INTRAVENOUS at 11:22

## 2024-01-19 ASSESSMENT — PAIN DESCRIPTION - DESCRIPTORS
DESCRIPTORS: BURNING
DESCRIPTORS: BURNING

## 2024-01-19 ASSESSMENT — PAIN DESCRIPTION - LOCATION
LOCATION: VULVA
LOCATION: VULVA

## 2024-01-19 ASSESSMENT — PAIN - FUNCTIONAL ASSESSMENT
PAIN_FUNCTIONAL_ASSESSMENT: 0-10
PAIN_FUNCTIONAL_ASSESSMENT: ACTIVITIES ARE NOT PREVENTED

## 2024-01-19 ASSESSMENT — PAIN SCALES - GENERAL
PAINLEVEL_OUTOF10: 3
PAINLEVEL_OUTOF10: 6

## 2024-01-19 NOTE — DISCHARGE SUMMARY
Urogynecology Discharge Summary  [facility]      Patient Name: Sarah Martin  Patient : 1960  Primary Care Physician: Denise Ram MD  Admit Date: (Not on file)    Principal Diagnosis: Cervical Stenosis, Abnormal Pap Smear with history of cervical dysplasia. Multiple vulvar lesions.    Other Diagnosis:   Lesion of vulva [N90.89]  Vaginal lesion [N89.8]  Patient Active Problem List   Diagnosis    Stage 3 severe COPD by GOLD classification (HCC)    BLADIMIR (generalized anxiety disorder)    Other insomnia    Osteopenia determined by x-ray    Seasonal allergies    Ex-smoker    Mixed hyperlipidemia    Arthritis of right hand    Maculopapular rash, localized       Infection: No  Hospital Acquired: No    Surgical Operations & Procedures: Loop electrical excisional procedure of the exocervix with endocervical curetting.  Excision of multiple vulvar lesions.    Consultations: Anesthesia    Pertinent Findings & Procedures:   Sarah Martin is a 63 y.o. female No obstetric history on file., admitted for postoperative pain management.  She underwent Loop electrical excisional procedure of the exocervix with  endocervical curetting with Excision of multiple vulvar lesions on 24  . Post-operatively, patient voided. She was discharged home POD #0 without a cardenas catheter.   Follow up in 1 week. Discharge instructions reviewed and questions answered.    Course of patient: normal    Discharge to: Home    Readmission planned: No    Recommendations on Discharge:     Medications:     Medication List        CONTINUE taking these medications      albuterol sulfate  (90 Base) MCG/ACT inhaler  Commonly known as: PROVENTIL;VENTOLIN;PROAIR     budesonide-formoterol 160-4.5 MCG/ACT Aero  Commonly known as: SYMBICORT     calcium carbonate 1250 (500 Ca) MG tablet  Commonly known as: OYSTER SHELL CALCIUM 500 mg     celecoxib 200 MG capsule  Commonly known as: CELEBREX     D-3-5 125 MCG (5000 UT) Caps

## 2024-01-19 NOTE — H&P
UroGyn Pre-Op H&P  [facility]    Patient Name: Sarah Martin     Patient : 1960  Room/Bed: Room/bed info not found  Admission Date/Time: No admission date for patient encounter.  Primary Care Physician: Denise Ram MD  MRN: [id number]    Date: 2024  Time: 6:47 AM    The patient was seen in pre-op holding. She is here for LEEP procedure, introital lesion biopsy excision    The patient is being admitted for a planned elective surgical procedure today. She has had symptoms, physical findings, and diagnostic testing that have an appropriate indication for today's planned procedure. The patient has been educated about their condition, conservative and surgical options was been offered to the patient, and the patient has decided to proceed with a surgical option. An informed consent has been signed under no duress or confusion. If indicated, the patient has been surgically cleared by her PCP and /or any pertinent specialist. All labs and testing have been reviewed. If of reproductive age and without permanent sterilization, a pregnancy test was confirmed as negative. The patient has satisfactorily completed any pre-operative preparations prior to surgery. If MRSA positive, she had completed the standard surgical preparation protocol. The patient took any required medicines prior to surgery with a sip of water but otherwise had taken nothing by mouth. The patient has discontinued any blood thinners as required to proceed with surgery. The patient denies chest pain, shortness of breath, calf pain, or open sores on the day of surgery. All dentures and body jewelry have been removed and / or taped.      REVIEW OF SYSTEMS:  14 point ROS negative except for above listed in HPI.   General/Constitutional: Denies chills, fever, headaches, weight gain, weight loss   Ophthalmologic: Denies recent abrupt vision changes, blurry vision   ENT: Denies nasal discharge, congestion, sinus pain, sore  effusion or pneumothorax. No areas of consolidation. Right upper lobe 0.6 cm pulmonary nodule (2/107). Right upper lobe 0.4 cm pulmonary nodule (2/91) with spiculation or scarring. Mild emphysema. Chronic right middle lobe collapse. Thoracic Aorta & Great Vessels: Normal in diameter. Mild atherosclerotic calcification. Heart & Pericardium: No coronary arterial calcification. Unremarkable cardiac morphology and pericardium. Lymph Nodes: No enlarged thoracic lymph nodes. Mediastinum & Esophagus: Unremarkable. Visualized Upper Abdomen: Unremarkable. Bones & Chest Wall: Mild degenerative changes with normal thoracic vertebral body heights. No destructive lytic or sclerotic lesions. _______________________________ IMPRESSION: 1. No pulmonary embolism. 2. Right upper lobe pulmonary nodules measuring up to 0.6 cm with associated spiculation or scarring. Follow-up CT of the chest is recommended in 6 months. 3. Chronic right middle lobe collapse. THIS REPORT CONTAINS A SIGNIFICANT RESULT AND/OR RECOMMENDATION, WHICH REQUIRES THE ATTENTION OF THE LICENSED CAREGIVER RESPONSIBLE FOR THIS PATIENT. THEREFORE, I SPECIFICALLY DESIGNATED THIS REPORT TO BE TELEPHONED BY THE RADIOLOGY DEPARTMENT. FINDINGS WERE INSTRUCTED TO BE CALLED TO THE CLINICAL SERVICE ON 12/23/2023 AT 12:40 PM. _______________________________ All CT scans at this facility use dose modulation, iterative reconstruction, and/or weight based dosing when appropriate to reduce radiation dose to as low as reasonably achievable. Workstation:[id number] Finalized by Gus Pak MD on 12/23/2023 12:42 PM    XR CHEST 1 VIEW    Result Date: 12/23/2023  CHEST SINGLE AP VIEW 12/23/2023 11:19 AM CLINICAL INDICATION: Shortness of breath/history of COPD COMPARISON: None IMPRESSION: 1. No acute cardiopulmonary disease. Workstation:[id number] Finalized by Gus Pak MD on 12/23/2023 11:47 AM      DIAGNOSIS & PLAN:  1. Abnormal Pap Smear   - Proceed with planned

## 2024-01-19 NOTE — ANESTHESIA POSTPROCEDURE EVALUATION
Department of Anesthesiology  Postprocedure Note    Patient: Sarah Martin  MRN: [id number]  YOB: 1960  Date of evaluation: 1/19/2024    Procedure Summary       Date: 01/19/24 Room / Location: [address]    Anesthesia Start: 1132 Anesthesia Stop: 1215    Procedures:       LEEP PROCEDURE      INTROITAL LESION AND VULVAR BIOPSY EXCISIONS Diagnosis:       Lesion of vulva      Vaginal lesion      (Lesion of vulva [N90.89])      (Vaginal lesion [N89.8])    Surgeons: Cliff Childress DO Responsible Provider: Dio Roldan MD    Anesthesia Type: general ASA Status: 3            Anesthesia Type: No value filed.    Evi Phase I: Evi Score: 5    Evi Phase II:      Anesthesia Post Evaluation    Patient location during evaluation: PACU  Patient participation: complete - patient participated  Level of consciousness: awake and alert  Airway patency: patent  Nausea & Vomiting: no nausea and no vomiting  Cardiovascular status: hemodynamically stable  Respiratory status: room air and spontaneous ventilation  Hydration status: euvolemic  Multimodal analgesia pain management approach  Pain management: adequate    No notable events documented.

## 2024-01-19 NOTE — ANESTHESIA PRE PROCEDURE
Applicable): No results found for: \"PREGTESTUR\", \"PREGSERUM\", \"HCG\", \"HCGQUANT\"     ABGs: No results found for: \"PHART\", \"PO2ART\", \"HPK0EJS\", \"WRM6NCI\", \"BEART\", \"M0TGXEPU\"     Type & Screen (If Applicable):  No results found for: \"LABABO\", \"LABRH\"    Drug/Infectious Status (If Applicable):  No results found for: \"HIV\", \"HEPCAB\"    COVID-19 Screening (If Applicable): No results found for: \"COVID19\"        Anesthesia Evaluation  Patient summary reviewed and Nursing notes reviewed  Airway: Mallampati: I  TM distance: >3 FB   Neck ROM: full  Mouth opening: > = 3 FB   Dental:      Comment: -MISSING SOME UPPER AND LOWER TEETH    Pulmonary:normal exam    (+)  COPD:          asthma:                           ROS comment: -QUIT SMOKING 15 YEARS AGO  -ASTHMA - INFREQUENT ALBUTEROL USE; USES MAINTENANCE INHALERS   Cardiovascular:Negative CV ROS          ECG reviewed                     ROS comment: -EKG - SR @ 87     Neuro/Psych:                ROS comment: -S/P LUMBAR FUSION  -OSTEOPOROSIS GI/Hepatic/Renal:   (+) morbid obesity          Endo/Other:    (+) : arthritis:., malignancy/cancer.                  ROS comment: -OVARIAN CANCER  -NPO AFTER MIDNIGHT  -ALLERGIES - AUGMENTIN, BACLOFEN, EES, SULFA  -USED STEROIDS LAST MONTH FOR RHINOVIRUS INFECTION Abdominal: normal exam            Vascular: negative vascular ROS.         Other Findings:       Anesthesia Plan      general     ASA 3     (LMA)  Induction: intravenous.    MIPS: Postoperative opioids intended and Prophylactic antiemetics administered.  Anesthetic plan and risks discussed with patient.      Plan discussed with CRNA.    Attending anesthesiologist reviewed and agrees with Preprocedure content            JUAN DIEGO FERREIRA MD   1/19/2024

## 2024-01-19 NOTE — OP NOTE
[address] [address]                                OPERATIVE REPORT    PATIENT NAME: CHRIS DE SOUZA                   :        1960  MED REC NO:   [id number]                             ROOM:  ACCOUNT NO:   [id number]                           ADMIT DATE: 2024  PROVIDER:     Cliff Childress DO    DATE OF PROCEDURE:  2024    INDICATIONS FOR SURGERY:  Cervical stenosis with a history of prior  abnormal Pap smears and an unsatisfactory Pap smear exam.  Also multiple  vulvar lesions with history of dysplasia.    PREOPERATIVE DIAGNOSES:  Cervical stenosis with prior history of  cervical dysplasia, multiple vulvar lesions involving sebaceous cyst,  painful skin tags and a melanotic left labia majora lesion.    POSTOPERATIVE DIAGNOSES:  Cervical stenosis with prior history of  cervical dysplasia, multiple vulvar lesions involving sebaceous cyst,  painful skin tags and a melanotic left labia majora lesion.    PROCEDURE:  Loop electrical excisional procedure of the exocervix with  endocervical curetting.  Excision of multiple vulvar lesions.    SURGEON:  Cliff Childress DO    ASSISTANT:  *------*    ANESTHESIA  MAC.    FINDINGS  As above.    SPECIMENS:  Exocervical cap, endocervical curetting, left vulvar lesions  and left vulvar melanotic lesions separately.    BLEEDING:  None.    COMPLICATIONS:  None.    DRAINS:  None.    IMPLANTS:  None.    COURSE:  Prior to the procedure, risks and benefits of the procedure  were explained to the patient.  The patient understood and signed  informed consent under no duress or confusion.  She was taken back to  the OR and prepped and draped in sterile fashion in the lithotomy  position under MAC anesthesia.  Surgical time-out was taken.  The  anterior lip of the cervix was grasped with a [place]-coated tenaculum  and using a small LEEP instrument at a cut of 40, a small  exocervical  cap was removed.  5% acetic acid staining did not reveal any dysplasia,  but once we could get into the endocervical canal, it was considered  more of a satisfactory exam and endocervical curetting followed.  There  was no exudation of mucous material or any suspicious material.  The bed  was treated with pinpoint cautery and Lugol solution for hemostasis.    Then attention was turned to the introitus and labia where there were  multiple lesions including three vulvar skin tags on the left that were  excised and treated with pinpoint cautery as well as a melanotic lesion  on the left and that was excised and treated with pinpoint cautery.   There were two sebaceous cysts additionally that were incised and sebum  expelled.  There were no acetowhite lesions of the introitus or anything  that was suspicious for VAIN or THEODORA.  The procedure was felt to be  complete.  Sponge and needle counts were correct.  The patient tolerated  the procedure well.  Postoperative time-out occurred.  The patient went  to recovery in stable fashion.  The  was updated by personal  consultation.  The patient will go home on usual medicines and may  restart all medicines.  She does have home going instructions.      CHACHA CHILDRESS DO    D: 01/19/2024 12:30:09       T: 01/19/2024 12:33:49     [email or web address]  Job#: [id number]     Doc#: [id number]    CC:  Chacha Childress DO

## 2024-01-19 NOTE — DISCHARGE INSTRUCTIONS
Activity  You have had anesthesia today  Do not drive, operate heavy equipment, consume alcoholic beverages, or make any important decisions  for 24 hours   If you are taking pain medication: Do not drive or consume alcohol.  Take your time changing positions today. You may feel light headed or dizzy if you move too quickly.   Continue your home medications as ordered by your physician.  Diet   You can eat your normal diet when you feel well. You should start off with bland foods like chicken soup, toast, or yogurt. Then advance as tolerated.  Drink plenty of fluids (unless your doctor tells you not to). Your urine should be very lightly colored without a strong odor.          POSTOPERATIVE PATIENT INSTRUCTIONS  MAJOR & MINOR SURGICAL PROCEDURES     Congratulations! You have taken the brave step of undergoing a surgery in order to try to improve your health.  Now it is time to focus on healing outside of the hospital / surgery center setting.  To make your dismissal as successful as possible, it is recommended that you thoroughly review these postoperative instructions. These instructions pertain to, but are not limited to the following procedures:      MAJOR   Hysterectomy - Vaginal / Laparoscopic / Robotic   With or Without tube-ovarian Removal (Salpingo-oophorectomy)   Sacrocolpopexy / Sacroperineopexy / Sacrocervicopexy (Lifting the vagina / cervix to the sacrum)  Major Vaginal Prolapse repairs   Cystocele repair (Anterior Colporrhaphy)   Rectocele repair (Posterior Colporrhaphy)   Enterocele repair    Vaginal vault repair   Closing or removal of the vagina (Colpocleisis / Colpectomy)   Major urinary incontinence surgery (Price Urethropexy, MMK)   Major fibroid removal (Myomectomy)   Major tubal surgery (re-anastomosis, ectopic pregnancy, pelvic inflammatory disease)   Major surgery for adhesions or endometriosis involving bowel   Major vaginal mesh removal    Fistula repair of the bladder or colorectum to the  discharge 4-6 weeks after your surgery, which may be watery, yellowish, or pinkish.  It may have more of an acidic odor.  This is common as the vagina flushes out edematous body fluid and dissolves the absorbable sutures.   As the healing process progresses, you may experience mild itching within the vagina, as well as outside the vaginal opening.  If this itching become severe, and/or is accompanied by a foul smell and swelling, please call the office.   DRAINS & WOUND CARE   If a drain or specific wound care appliance is present at the time of dismissal, please follow additional instructions that may be provided regarding maintenance of the device/s.   Basic daily maintenance of a drain is as follows:   You may wash around the drain site with warm, soapy water and pat dry with a towel.   If indicated, use a topical antibiotic around the drain site 2x / day.   Strip or milk the drain from the drain site to the bulb suction 3x / day. This clears any blockage from the drain.     Simply pinch the drain at its insertion site into your body between your thumb and forefinger. Then squeeze and  pull the drainage tubing as you move towards the drain bulb, allowing the tube to slide between your fingers with  mild resistance, you should see a suction effect within the drain tube that moves fluid toward the bulb.    Please call the office immediately if the drain falls out or cannot maintain suction.    Please call if redness of the drain site increases and is associated with fever, pain, or purulent discharge.    CONSTIPATION   * Patients are often constipated after a prolonged period of bed rest, or with the use of oral narcotic pain medications.  If you have not had a bowel movement for 3 days after you are dismissed from the hospital, or are uncomfortable and unable to pass stool, please try one or all of the following measures in a stepwise manner:      Eat fruits, vegetables, prunes & whole-grain foods.  Drink 8

## 2024-01-25 LAB — SURGICAL PATHOLOGY REPORT: NORMAL

## 2024-02-06 ENCOUNTER — TELEPHONE (OUTPATIENT)
Age: 64
End: 2024-02-06

## 2024-02-06 NOTE — TELEPHONE ENCOUNTER
Please call Dr. Childress's office to determine if patient had her follow-up appointment with him. If she has not had an appointment yet, please ask if patient was notified of her results.

## 2024-02-06 NOTE — TELEPHONE ENCOUNTER
Patient saw Dr Childress for a post surgery visit 1/29/24 (will fax note to our office).  She is aware of her results and having a colposcopy in April.

## 2024-02-09 ENCOUNTER — OFFICE VISIT (OUTPATIENT)
Age: 64
End: 2024-02-09
Payer: MEDICARE

## 2024-02-09 VITALS
SYSTOLIC BLOOD PRESSURE: 134 MMHG | RESPIRATION RATE: 18 BRPM | WEIGHT: 210.5 LBS | BODY MASS INDEX: 37.29 KG/M2 | DIASTOLIC BLOOD PRESSURE: 62 MMHG | HEART RATE: 101 BPM | TEMPERATURE: 97.8 F

## 2024-02-09 DIAGNOSIS — M85.80 OSTEOPENIA DETERMINED BY X-RAY: Primary | ICD-10-CM

## 2024-02-09 DIAGNOSIS — E66.01 SEVERE OBESITY (BMI 35.0-39.9) WITH COMORBIDITY (HCC): ICD-10-CM

## 2024-02-09 DIAGNOSIS — J44.9 STAGE 2 MODERATE COPD BY GOLD CLASSIFICATION (HCC): ICD-10-CM

## 2024-02-09 DIAGNOSIS — E78.2 MIXED HYPERLIPIDEMIA: ICD-10-CM

## 2024-02-09 DIAGNOSIS — Z87.891 FORMER SMOKER: ICD-10-CM

## 2024-02-09 PROCEDURE — 99214 OFFICE O/P EST MOD 30 MIN: CPT | Performed by: STUDENT IN AN ORGANIZED HEALTH CARE EDUCATION/TRAINING PROGRAM

## 2024-02-09 ASSESSMENT — PATIENT HEALTH QUESTIONNAIRE - PHQ9
SUM OF ALL RESPONSES TO PHQ QUESTIONS 1-9: 0
1. LITTLE INTEREST OR PLEASURE IN DOING THINGS: 0
SUM OF ALL RESPONSES TO PHQ QUESTIONS 1-9: 0
SUM OF ALL RESPONSES TO PHQ QUESTIONS 1-9: 0
2. FEELING DOWN, DEPRESSED OR HOPELESS: 0
SUM OF ALL RESPONSES TO PHQ9 QUESTIONS 1 & 2: 0
SUM OF ALL RESPONSES TO PHQ QUESTIONS 1-9: 0

## 2024-02-09 NOTE — ASSESSMENT & PLAN NOTE
- pt has been making dietary changes and is trying to incorporate more exercise, but has had some difficulty with this with recent hospitalization and SO's recent arthroscopy and recovery  - continue Crestor 5mg

## 2024-02-09 NOTE — PROGRESS NOTES
Visit on 01/10/2024   Component Date Value Ref Range Status    Sodium 01/10/2024 142  136 - 145 mmol/L Final    Potassium 01/10/2024 4.0  3.7 - 5.3 mmol/L Final    Chloride 01/10/2024 106  98 - 107 mmol/L Final    CO2 01/10/2024 26  20 - 31 mmol/L Final    Anion Gap 01/10/2024 10  9 - 16 mmol/L Final    Glucose 01/10/2024 96  74 - 99 mg/dL Final    BUN 01/10/2024 14  8 - 23 mg/dL Final    Creatinine 01/10/2024 0.8  0.50 - 0.90 mg/dL Final    Est, Glom Filt Rate 01/10/2024 >60  >60 mL/min/1.73m2 Final    Comment:       These results are not intended for use in patients <18 years of age.        eGFR results are calculated without a race factor using the 2021 CKD-EPI equation.  Careful clinical correlation is recommended, particularly when comparing to results   calculated using previous equations.  The CKD-EPI equation is less accurate in patients with extremes of muscle mass, extra-renal   metabolism of creatine, excessive creatine ingestion, or following therapy that affects   renal tubular secretion.      Calcium 01/10/2024 9.0  8.6 - 10.4 mg/dL Final    WBC 01/10/2024 9.4  3.5 - 11.3 k/uL Final    RBC 01/10/2024 4.91  3.95 - 5.11 m/uL Final    Hemoglobin 01/10/2024 15.1  11.9 - 15.1 g/dL Final    Hematocrit 01/10/2024 45.8  36.3 - 47.1 % Final    MCV 01/10/2024 93.3  82.6 - 102.9 fL Final    MCH 01/10/2024 30.8  25.2 - 33.5 pg Final    MCHC 01/10/2024 33.0  28.4 - 34.8 g/dL Final    RDW 01/10/2024 12.4  11.8 - 14.4 % Final    Platelets 01/10/2024 225  138 - 453 k/uL Final    MPV 01/10/2024 8.9  8.1 - 13.5 fL Final    NRBC Automated 01/10/2024 0.0  0.0 per 100 WBC Final    Ventricular Rate 01/10/2024 87  BPM Final    Atrial Rate 01/10/2024 87  BPM Final    P-R Interval 01/10/2024 160  ms Final    QRS Duration 01/10/2024 96  ms Final    Q-T Interval 01/10/2024 394  ms Final    QTc Calculation (Bazett) 01/10/2024 474  ms Final    P Axis 01/10/2024 61  degrees Final    R Axis 01/10/2024 45  degrees Final    T Axis

## 2024-02-09 NOTE — PATIENT INSTRUCTIONS
Thank you for following up with us at [facility] office! It was a pleasure to meet you today!     Our plan is the following:  - I'd like to see you again in 3-4mo so we can make sure your lipid panel stays within normal limits. We'll repeat your lipid panel at that time to make sure your Crestor dose is still working well for you.     - Congrats your 15 years smoking anniversary! That's a big deal, you should be proud of all your hard work!    Your medication list is included in the after visit summary. If you find any differences when compared to your medications at home, please contact the office ([phone]) to let us know.   You can also call the office if you have any additional questions or concerns and speak to one of the staff. They will triage and forward the message to the provider.   Have a great rest of your day!

## 2024-02-09 NOTE — ASSESSMENT & PLAN NOTE
- continue PO calcium, vit D  - UpToDate recommends repeat DEXA in 2yrs, although [facility] reports 2yr screening may not be necessary.  - plan to repeat in 5/2025 as last DEXA indicated moderate osteopenia

## 2024-02-09 NOTE — ASSESSMENT & PLAN NOTE
- recently hospitalized at [facility] for COPD exacerbation, no longer requiring continuous supplemental oxygen use.

## 2024-04-30 RX ORDER — NICOTINE 14MG/24HR
PATCH, TRANSDERMAL 24 HOURS TRANSDERMAL
COMMUNITY

## 2024-04-30 NOTE — PROGRESS NOTES
DAY OF SURGERY/PROCEDURE  GUIDELINES    As a patient at the [facility], you can expect quality medical and nursing care that is centered on your individual needs. It is our goal to make your surgical experience as comfortable and excellent as possible.  ________________________________________________________________________    The following instructions are general guidelines, if any information on this sheet is different from what your doctor has instructed you to do, please follow your doctor's instructions.    Please arrive on 5/8/2024 @ 0600      Enter through entrance C. Check in at registration     Upon arrival you will be taken to the pre-operative area to get ready for surgery, your family will stay in the waiting room and visit with you once you are ready for surgery. Due to special limitations please limit visitation to 1-2 members of your family at a time. When it is time for surgery your family will return to the waiting room.    Nothing to eat, drink, smoke, suck or chew after midnight (no water, gum, mints, cigarettes, cigars, pipes, snuff, chewing tobacco, etc.) or your surgery may be canceled.     Take a shower or bath on the morning of your surgery/procedure (Hibiclens if directed) Do not apply any lotions.    Brush your teeth, but do not swallow any water    IN CASE OF ILLNESS - If you have a cold or flu symptoms (high fever, runny nose, sore throat, cough, etc.) rash, nausea, vomiting, loose stools, and/or recent contact with someone who has a contagious disease (chick pox, measles, etc.) please call your doctor before coming to the surgery center    Take a small sip of water with heart, blood pressure, and/or seizure medication the morning of surgery. Take inhalers and use nebulizer    If applicable bring your:  Inhaler (s)  Hearing aid(s)  Eyeglasses and Case (If you wear contacts they have to be removed before surgery, bring case and solution)  CPAP     DO NOT take

## 2024-05-07 ENCOUNTER — ANESTHESIA EVENT (OUTPATIENT)
Dept: OPERATING ROOM | Age: 64
End: 2024-05-07
Payer: MEDICARE

## 2024-05-07 NOTE — DISCHARGE SUMMARY
Urogynecology Discharge Summary  [facility]      Patient Name: Sarah Martin  Patient : 1960  Primary Care Physician: Denise Ram MD  Admit Date: 2024    Principal Diagnosis: Post menopausal bleeding, history of cervical dysplasia, vulvar cyst    Other Diagnosis:   Postmenopausal bleeding [N95.0]  History of cervical dysplasia [Z87.410]  Cyst, vulva [N90.7]  Patient Active Problem List   Diagnosis    Stage 2 moderate COPD by GOLD classification ([facility])    BLADIMIR (generalized anxiety disorder)    Other insomnia    Osteopenia determined by x-ray    Seasonal allergies    Ex-smoker    Mixed hyperlipidemia    Arthritis of right hand    Maculopapular rash, localized       Infection: No  Hospital Acquired: No    Surgical Operations & Procedures: Hysteroscopy dilation and curettage, LEEP, Excisional biopsy of vulvar lesion    Consultations: Anesthesia    Pertinent Findings & Procedures:   Sarah Martin is a 63 y.o. female admitted for Post menopausal bleeding, history of cervical dysplasia, vulvar cyst; received scopalamine patch.  She underwent Hysteroscopy dilation and curettage, LEEP, Excisional biopsy of vulvar lesion on 24. Post-op course normal, discharged home on POD# 0.  Follow up in 1 week. Discharge instructions reviewed and questions answered.    Course of patient: normal    Discharge to: Home    Readmission planned: No    Recommendations on Discharge:     Medications:     Medication List        START taking these medications      ibuprofen 600 MG tablet  Commonly known as: ADVIL;MOTRIN  Take 1 tablet by mouth 4 times daily as needed for Pain     Lidocaine 4 % Oint  Apply 1 Application topically 3 times daily as needed (vaginal pain)     neomycin-bacitracin-polymyxin 5-400-5000 ointment  Apply topically 4 times daily x 7 days     ondansetron 4 MG tablet  Commonly known as: ZOFRAN  Take 1 tablet by mouth daily as needed for Nausea or Vomiting            CONTINUE taking

## 2024-05-07 NOTE — DISCHARGE INSTRUCTIONS
POSTOPERATIVE PATIENT INSTRUCTIONS  MAJOR & MINOR SURGICAL PROCEDURES      Congratulations! You have taken the brave step of undergoing surgery in order to try to improve your health.  Now it is time to focus on healing outside of the hospital / surgery center setting.  To make your dismissal as successful as possible, it is recommended that you thoroughly review these postoperative instructions. These instructions pertain to, but are not limited to the following procedures:      MAJOR   Hysterectomy - Vaginal / Laparoscopic / Robotic   With or Without tube-ovarian Removal (Salpingo-oophorectomy)   Sacrocolpopexy / Sacroperineopexy / Sacrocervicopexy/ Hysteropexy (lifting apex to sacrum)  Major Vaginal Prolapse repairs   Cystocele repair (Anterior Colporrhaphy)   Rectocele repair (Posterior Colporrhaphy)   Enterocele repair    Vaginal vault repair   Closing or removal of the vagina (Colpocleisis / Colpectomy)   Major urinary incontinence surgery (Price Urethropexy, MMK)   Major fibroid removal (Myomectomy)   Major tubal surgery (re-anastomosis, ectopic pregnancy, pelvic inflammatory disease)   Major surgery for adhesions or endometriosis involving bowel   Major vaginal mesh removal    Fistula repair of the bladder or colorectum to the vagina   Creation of a new vagina (Neovaginoplasty) or repair of a Mullerian Anomaly   Other       MINOR   Hysteroscopy with Dilatation / Curettage, Endometrial Ablation   Laparoscopy With or Without minor tubal or ovarian surgery   Minor Vaginal Prolapse repairs   Cystocele repair (Anterior Colporrhaphy)   Rectocele repair (Posterior Colporrhaphy)   Urethrocele repair    Minor urinary incontinence surgery (Slings, Transurethral Bulking)   Minor vaginal surgery (Mesh removal, Laser ablation, Biopsies, Labial revisions, Injections)    Minor surgery of the bladder (DMSO, Hydrodistention, Botox, etc.)   Neuromodulation  Other     1         2      POST OPERATIVE BASIC INSTRUCTIONS    until the catheter is removed with an update on your progress.    9         HOW TO CARE FOR YOUR THOMAS CATHETER - FEMALE      About this Topic:  Thomas catheter is a thin, flexible tube that drains urine from your bladder.  The catheter connects to a special bag.  The bag holds the urine until you are able to empty the bag.  You may need to have a catheter for a short time.  You may need a catheter after you are sick or have had surgery.  Sometimes a catheter is used for a long time.     What Will the Results Be:  Your urine will drain and you will prevent infection.            What Care is Needed at Home?   Ask your doctor what you need to do when you go home.  Make sure you ask questions if you do not understand what the doctor says.  This way you will know what you need to do.    Your doctor may order a home health nurse to come to your house to help you learn to care for your catheter.    Prevent infections:   Wash your hands before and after handling your catheter.   If you switch between a leg bag and an overnight drainage bag, be sure you clean the connection between the catheter and the bag before you switch bags.  Ask your doctor what to use to clean the connection.   Place a cap on the drainage bag you are not using and store in a clean towel.   Rinse the empty drainage bag not in use with 1 cup vinegar mixed with 1 cup water.    Care for the tube:   Wash the skin around the catheter with soap and water each day.  Pat the skin dry.   Do not put anything on the tube.   Keep the tube secure.  Do not let the tube pull or catch when you are moving around during the day.   Do not let the tube kink or loop.              Care for the Drainage Bag:   Keep your urine bag below your bladder.   Drain the bag often to help keep you from getting an infection.   Wear cotton underwear.    10  What Follow-Up Care Is Needed?  Your doctor may ask you to make visits to the office to check on your progress.  Be sure to keep

## 2024-05-07 NOTE — H&P
1 patch at 05/08/24 0637    lidocaine PF 1 % injection 1 mL  1 mL IntraDERmal Once PRN Allyssa Vergara MD        lactated ringers IV soln infusion   IntraVENous Continuous Allyssa Vergara  mL/hr at 05/08/24 0703 NoRateChange at 05/08/24 0703    sodium chloride flush 0.9 % injection 5-40 mL  5-40 mL IntraVENous 2 times per day Allyssa Vergara MD        sodium chloride flush 0.9 % injection 5-40 mL  5-40 mL IntraVENous PRN Allyssa Vergara MD        0.9 % sodium chloride infusion   IntraVENous PRN Allyssa Vergara MD        scopolamine (TRANSDERM-SCOP) 1 MG/3DAYS transdermal patch                FAMILY HISTORY:  family history includes Arthritis in her father and mother; Stroke in her mother.    SOCIAL HISTORY:   reports that she quit smoking about 15 years ago. Her smoking use included cigarettes. She started smoking about 55 years ago. She has a 40.1 pack-year smoking history. She has never used smokeless tobacco. She reports that she does not drink alcohol and does not use drugs.    VITALS:  Vitals:    04/30/24 1539 05/08/24 0609   BP:  (!) 146/80   Pulse:  92   Resp:  16   Temp:  97.3 °F (36.3 °C)   TempSrc:  Infrared   SpO2:  94%   Weight: 95.3 kg (210 lb) 95.9 kg (211 lb 6.4 oz)   Height: 1.6 m (5' 3\") 1.6 m (5' 3\")                                                                                                                               PHYSICAL EXAM:     Unchanged from Prior H&P  General appearance: Well-developed, well-nourished, in no acute distress. Pleasant, alert and oriented to person place and time  Head/face: Normocephalic atraumatic, normal facial strength, no sinus tenderness, and no scars  Eyes: Extraocular movement intact and pupils equal round and reactive to light and accommodation  Neck/thyroid: Neck supple, thyroid nontender without masses, trachea midline  Lymph nodes: No axillary, anterior and posterior cervical, femoral, supraclavicular, inguinal, umbilical, or pelvic  adenopathy.  Skin: No pallor, jaundice or cyanosis. No rashes or lesions present. If tattoos present - noted.  Heart: Regular rate and rhythm, S1, S2 normal. No murmurs.  Lungs: Respiratory rhythm and depth normal, accessory muscles not use during expiration.  Breast: Deferred per patient. If breast exam completed, normal appearance, no masses or discharge bilaterally.  Abdomen: Nontender, no masses palpable, no muscle rigidity, no hernias present, no hepatosplenomegaly, no ascitic fluid wave, normal bowel sounds present, all scars noted.  Musculoskeletal: Full range of motion on all extremities, normal gait, strength and tone. Distal pulses 2 over 4 x 4 extremities.  Neurologic: Alert and oriented to time, place and person. No apparent motor and sensory deficits. No effects/mood.  Osteopathic: No cranial, cervical, thoracic or lumbosacral lesions or restrictions in motion.  Genitourinary: deferred to OR      LAB RESULTS:  No visits with results within 4 Week(s) from this visit.   Latest known visit with results is:   Admission on 01/19/2024, Discharged on 01/19/2024   Component Date Value Ref Range Status    Surgical Pathology Report 01/19/2024    Final                    Value:Path Number: [id number]    -- Diagnosis --  A.  EXOCERVICAL STENOSIS, BIOPSY:-LOW-GRADE SQUAMOUS INTRAEPITHELIAL  LESION (YVAN-1).  B.  ENDOCERVICAL CURETTINGS:-MUCINOUS MATERIAL WITH VIRTUALLY NO  CERVICAL TISSUE.  C.  VULVAR LESIONS, LEFT, EXCISIONAL BIOPSY:-FIBROEPITHELIAL POLYP.  D.  VULVAR MELANOTIC LESION, LEFT, BIOPSY:-HIGH-GRADE SQUAMOUS  INTRAEPITHELIAL LESION (THEODORA-2) WITH MELANIN INCONTINENCE.    -- Diagnosis Comment --  Parts A and D seen in consultation with Dr. Rajesh Trevino, who  concur with the diagnosis.    Reema Grajeda  **Electronically Signed Out**         ag/1/25/2024     Clinical Information  Pre-Op Diagnosis:  LESION OF VULVA, VAGINAL LESION  Operative Findings:  EXOCERVICAL STENOSIS; ENDOCERVICAL CURETTINGS;  LEFT VULVAR

## 2024-05-08 ENCOUNTER — ANESTHESIA (OUTPATIENT)
Dept: OPERATING ROOM | Age: 64
End: 2024-05-08
Payer: MEDICARE

## 2024-05-08 ENCOUNTER — HOSPITAL ENCOUNTER (OUTPATIENT)
Age: 64
Setting detail: OUTPATIENT SURGERY
Discharge: HOME | End: 2024-05-08
Attending: OBSTETRICS & GYNECOLOGY | Admitting: OBSTETRICS & GYNECOLOGY
Payer: MEDICARE

## 2024-05-08 VITALS
SYSTOLIC BLOOD PRESSURE: 122 MMHG | HEART RATE: 63 BPM | BODY MASS INDEX: 37.46 KG/M2 | HEIGHT: 63 IN | OXYGEN SATURATION: 92 % | DIASTOLIC BLOOD PRESSURE: 66 MMHG | RESPIRATION RATE: 14 BRPM | TEMPERATURE: 97.2 F | WEIGHT: 211.4 LBS

## 2024-05-08 DIAGNOSIS — N90.7 CYST, VULVA: ICD-10-CM

## 2024-05-08 DIAGNOSIS — Z87.410 HISTORY OF CERVICAL DYSPLASIA: ICD-10-CM

## 2024-05-08 DIAGNOSIS — N95.0 POSTMENOPAUSAL BLEEDING: ICD-10-CM

## 2024-05-08 PROCEDURE — 6360000002 HC RX W HCPCS: Performed by: NURSE ANESTHETIST, CERTIFIED REGISTERED

## 2024-05-08 PROCEDURE — 6370000000 HC RX 637 (ALT 250 FOR IP): Performed by: OBSTETRICS & GYNECOLOGY

## 2024-05-08 PROCEDURE — 7100000001 HC PACU RECOVERY - ADDTL 15 MIN: Performed by: OBSTETRICS & GYNECOLOGY

## 2024-05-08 PROCEDURE — 2580000003 HC RX 258: Performed by: STUDENT IN AN ORGANIZED HEALTH CARE EDUCATION/TRAINING PROGRAM

## 2024-05-08 PROCEDURE — 6370000000 HC RX 637 (ALT 250 FOR IP)

## 2024-05-08 PROCEDURE — 7100000010 HC PHASE II RECOVERY - FIRST 15 MIN: Performed by: OBSTETRICS & GYNECOLOGY

## 2024-05-08 PROCEDURE — 6370000000 HC RX 637 (ALT 250 FOR IP): Performed by: STUDENT IN AN ORGANIZED HEALTH CARE EDUCATION/TRAINING PROGRAM

## 2024-05-08 PROCEDURE — 3700000000 HC ANESTHESIA ATTENDED CARE: Performed by: OBSTETRICS & GYNECOLOGY

## 2024-05-08 PROCEDURE — 88305 TISSUE EXAM BY PATHOLOGIST: CPT

## 2024-05-08 PROCEDURE — 3600000014 HC SURGERY LEVEL 4 ADDTL 15MIN: Performed by: OBSTETRICS & GYNECOLOGY

## 2024-05-08 PROCEDURE — 6360000002 HC RX W HCPCS

## 2024-05-08 PROCEDURE — 3700000001 HC ADD 15 MINUTES (ANESTHESIA): Performed by: OBSTETRICS & GYNECOLOGY

## 2024-05-08 PROCEDURE — 2500000003 HC RX 250 WO HCPCS: Performed by: NURSE ANESTHETIST, CERTIFIED REGISTERED

## 2024-05-08 PROCEDURE — 88307 TISSUE EXAM BY PATHOLOGIST: CPT

## 2024-05-08 PROCEDURE — 7100000011 HC PHASE II RECOVERY - ADDTL 15 MIN: Performed by: OBSTETRICS & GYNECOLOGY

## 2024-05-08 PROCEDURE — 7100000000 HC PACU RECOVERY - FIRST 15 MIN: Performed by: OBSTETRICS & GYNECOLOGY

## 2024-05-08 PROCEDURE — 3600000004 HC SURGERY LEVEL 4 BASE: Performed by: OBSTETRICS & GYNECOLOGY

## 2024-05-08 PROCEDURE — 2709999900 HC NON-CHARGEABLE SUPPLY: Performed by: OBSTETRICS & GYNECOLOGY

## 2024-05-08 RX ORDER — SODIUM CHLORIDE 0.9 % (FLUSH) 0.9 %
5-40 SYRINGE (ML) INJECTION EVERY 12 HOURS SCHEDULED
Status: DISCONTINUED | OUTPATIENT
Start: 2024-05-08 | End: 2024-05-08 | Stop reason: HOSPADM

## 2024-05-08 RX ORDER — MORPHINE SULFATE 2 MG/ML
2 INJECTION, SOLUTION INTRAMUSCULAR; INTRAVENOUS EVERY 5 MIN PRN
Status: DISCONTINUED | OUTPATIENT
Start: 2024-05-08 | End: 2024-05-08 | Stop reason: HOSPADM

## 2024-05-08 RX ORDER — SODIUM CHLORIDE 9 MG/ML
INJECTION, SOLUTION INTRAVENOUS PRN
Status: DISCONTINUED | OUTPATIENT
Start: 2024-05-08 | End: 2024-05-08 | Stop reason: HOSPADM

## 2024-05-08 RX ORDER — GLYCOPYRROLATE 0.2 MG/ML
0.4 INJECTION INTRAMUSCULAR; INTRAVENOUS ONCE
Status: DISCONTINUED | OUTPATIENT
Start: 2024-05-08 | End: 2024-05-08 | Stop reason: HOSPADM

## 2024-05-08 RX ORDER — FENTANYL CITRATE 50 UG/ML
INJECTION, SOLUTION INTRAMUSCULAR; INTRAVENOUS PRN
Status: DISCONTINUED | OUTPATIENT
Start: 2024-05-08 | End: 2024-05-08 | Stop reason: SDUPTHER

## 2024-05-08 RX ORDER — ONDANSETRON 4 MG/1
4 TABLET, FILM COATED ORAL DAILY PRN
Qty: 20 TABLET | Refills: 0 | Status: SHIPPED | OUTPATIENT
Start: 2024-05-08

## 2024-05-08 RX ORDER — ONDANSETRON 2 MG/ML
INJECTION INTRAMUSCULAR; INTRAVENOUS PRN
Status: DISCONTINUED | OUTPATIENT
Start: 2024-05-08 | End: 2024-05-08 | Stop reason: SDUPTHER

## 2024-05-08 RX ORDER — OXYCODONE HYDROCHLORIDE AND ACETAMINOPHEN 5; 325 MG/1; MG/1
1 TABLET ORAL
Status: COMPLETED | OUTPATIENT
Start: 2024-05-08 | End: 2024-05-08

## 2024-05-08 RX ORDER — DIPHENHYDRAMINE HYDROCHLORIDE 50 MG/ML
12.5 INJECTION INTRAMUSCULAR; INTRAVENOUS
Status: DISCONTINUED | OUTPATIENT
Start: 2024-05-08 | End: 2024-05-08 | Stop reason: HOSPADM

## 2024-05-08 RX ORDER — PROPOFOL 10 MG/ML
INJECTION, EMULSION INTRAVENOUS PRN
Status: DISCONTINUED | OUTPATIENT
Start: 2024-05-08 | End: 2024-05-08 | Stop reason: SDUPTHER

## 2024-05-08 RX ORDER — SODIUM CHLORIDE 0.9 % (FLUSH) 0.9 %
5-40 SYRINGE (ML) INJECTION PRN
Status: DISCONTINUED | OUTPATIENT
Start: 2024-05-08 | End: 2024-05-08 | Stop reason: HOSPADM

## 2024-05-08 RX ORDER — OXYCODONE HYDROCHLORIDE AND ACETAMINOPHEN 5; 325 MG/1; MG/1
2 TABLET ORAL
Status: DISCONTINUED | OUTPATIENT
Start: 2024-05-08 | End: 2024-05-08 | Stop reason: HOSPADM

## 2024-05-08 RX ORDER — LABETALOL HYDROCHLORIDE 5 MG/ML
10 INJECTION, SOLUTION INTRAVENOUS
Status: DISCONTINUED | OUTPATIENT
Start: 2024-05-08 | End: 2024-05-08 | Stop reason: HOSPADM

## 2024-05-08 RX ORDER — SCOLOPAMINE TRANSDERMAL SYSTEM 1 MG/1
PATCH, EXTENDED RELEASE TRANSDERMAL
Status: DISCONTINUED
Start: 2024-05-08 | End: 2024-05-08 | Stop reason: HOSPADM

## 2024-05-08 RX ORDER — PYRANTEL PAMOATE 100 %
POWDER (GRAM) MISCELLANEOUS PRN
Status: DISCONTINUED | OUTPATIENT
Start: 2024-05-08 | End: 2024-05-08 | Stop reason: ALTCHOICE

## 2024-05-08 RX ORDER — IPRATROPIUM BROMIDE AND ALBUTEROL SULFATE 2.5; .5 MG/3ML; MG/3ML
1 SOLUTION RESPIRATORY (INHALATION)
Status: DISCONTINUED | OUTPATIENT
Start: 2024-05-08 | End: 2024-05-08 | Stop reason: HOSPADM

## 2024-05-08 RX ORDER — IBUPROFEN 600 MG/1
600 TABLET ORAL 4 TIMES DAILY PRN
Qty: 30 TABLET | Refills: 1 | Status: SHIPPED | OUTPATIENT
Start: 2024-05-08

## 2024-05-08 RX ORDER — METOCLOPRAMIDE HYDROCHLORIDE 5 MG/ML
10 INJECTION INTRAMUSCULAR; INTRAVENOUS
Status: DISCONTINUED | OUTPATIENT
Start: 2024-05-08 | End: 2024-05-08 | Stop reason: HOSPADM

## 2024-05-08 RX ORDER — NALOXONE HYDROCHLORIDE 0.4 MG/ML
INJECTION, SOLUTION INTRAMUSCULAR; INTRAVENOUS; SUBCUTANEOUS PRN
Status: DISCONTINUED | OUTPATIENT
Start: 2024-05-08 | End: 2024-05-08 | Stop reason: HOSPADM

## 2024-05-08 RX ORDER — IBUPROFEN 200 MG
TABLET ORAL
Qty: 14 G | Refills: 0 | Status: SHIPPED | OUTPATIENT
Start: 2024-05-08

## 2024-05-08 RX ORDER — DEXAMETHASONE SODIUM PHOSPHATE 10 MG/ML
INJECTION, SOLUTION INTRAMUSCULAR; INTRAVENOUS PRN
Status: DISCONTINUED | OUTPATIENT
Start: 2024-05-08 | End: 2024-05-08 | Stop reason: SDUPTHER

## 2024-05-08 RX ORDER — LIDOCAINE HYDROCHLORIDE 10 MG/ML
INJECTION, SOLUTION INFILTRATION; PERINEURAL PRN
Status: DISCONTINUED | OUTPATIENT
Start: 2024-05-08 | End: 2024-05-08 | Stop reason: SDUPTHER

## 2024-05-08 RX ORDER — ACETIC ACID 5 %
LIQUID (ML) MISCELLANEOUS PRN
Status: DISCONTINUED | OUTPATIENT
Start: 2024-05-08 | End: 2024-05-08 | Stop reason: ALTCHOICE

## 2024-05-08 RX ORDER — SCOLOPAMINE TRANSDERMAL SYSTEM 1 MG/1
1 PATCH, EXTENDED RELEASE TRANSDERMAL
Status: DISCONTINUED | OUTPATIENT
Start: 2024-05-08 | End: 2024-05-08 | Stop reason: HOSPADM

## 2024-05-08 RX ORDER — LIDOCAINE HYDROCHLORIDE 10 MG/ML
1 INJECTION, SOLUTION EPIDURAL; INFILTRATION; INTRACAUDAL; PERINEURAL
Status: DISCONTINUED | OUTPATIENT
Start: 2024-05-08 | End: 2024-05-08 | Stop reason: HOSPADM

## 2024-05-08 RX ORDER — ONDANSETRON 2 MG/ML
4 INJECTION INTRAMUSCULAR; INTRAVENOUS
Status: DISCONTINUED | OUTPATIENT
Start: 2024-05-08 | End: 2024-05-08 | Stop reason: HOSPADM

## 2024-05-08 RX ORDER — MIDAZOLAM HYDROCHLORIDE 1 MG/ML
INJECTION INTRAMUSCULAR; INTRAVENOUS PRN
Status: DISCONTINUED | OUTPATIENT
Start: 2024-05-08 | End: 2024-05-08 | Stop reason: SDUPTHER

## 2024-05-08 RX ORDER — MEPERIDINE HYDROCHLORIDE 50 MG/ML
12.5 INJECTION INTRAMUSCULAR; INTRAVENOUS; SUBCUTANEOUS EVERY 5 MIN PRN
Status: DISCONTINUED | OUTPATIENT
Start: 2024-05-08 | End: 2024-05-08 | Stop reason: HOSPADM

## 2024-05-08 RX ORDER — KETOROLAC TROMETHAMINE 30 MG/ML
INJECTION, SOLUTION INTRAMUSCULAR; INTRAVENOUS PRN
Status: DISCONTINUED | OUTPATIENT
Start: 2024-05-08 | End: 2024-05-08 | Stop reason: SDUPTHER

## 2024-05-08 RX ORDER — MIDAZOLAM HYDROCHLORIDE 2 MG/2ML
2 INJECTION, SOLUTION INTRAMUSCULAR; INTRAVENOUS
Status: DISCONTINUED | OUTPATIENT
Start: 2024-05-08 | End: 2024-05-08 | Stop reason: HOSPADM

## 2024-05-08 RX ORDER — OXYCODONE HYDROCHLORIDE AND ACETAMINOPHEN 5; 325 MG/1; MG/1
TABLET ORAL
Status: COMPLETED
Start: 2024-05-08 | End: 2024-05-08

## 2024-05-08 RX ORDER — SODIUM CHLORIDE, SODIUM LACTATE, POTASSIUM CHLORIDE, CALCIUM CHLORIDE 600; 310; 30; 20 MG/100ML; MG/100ML; MG/100ML; MG/100ML
INJECTION, SOLUTION INTRAVENOUS CONTINUOUS
Status: DISCONTINUED | OUTPATIENT
Start: 2024-05-08 | End: 2024-05-08 | Stop reason: HOSPADM

## 2024-05-08 RX ORDER — HYDRALAZINE HYDROCHLORIDE 20 MG/ML
10 INJECTION INTRAMUSCULAR; INTRAVENOUS
Status: DISCONTINUED | OUTPATIENT
Start: 2024-05-08 | End: 2024-05-08 | Stop reason: HOSPADM

## 2024-05-08 RX ORDER — FERRIC SUBSULFATE 0.21 G/G
LIQUID TOPICAL PRN
Status: DISCONTINUED | OUTPATIENT
Start: 2024-05-08 | End: 2024-05-08 | Stop reason: ALTCHOICE

## 2024-05-08 RX ADMIN — MIDAZOLAM 2 MG: 1 INJECTION INTRAMUSCULAR; INTRAVENOUS at 07:23

## 2024-05-08 RX ADMIN — DEXAMETHASONE SODIUM PHOSPHATE 10 MG: 10 INJECTION INTRAMUSCULAR; INTRAVENOUS at 07:32

## 2024-05-08 RX ADMIN — FENTANYL CITRATE 50 MCG: 50 INJECTION, SOLUTION INTRAMUSCULAR; INTRAVENOUS at 08:08

## 2024-05-08 RX ADMIN — Medication 0.5 MG: at 08:43

## 2024-05-08 RX ADMIN — FENTANYL CITRATE 50 MCG: 50 INJECTION, SOLUTION INTRAMUSCULAR; INTRAVENOUS at 07:27

## 2024-05-08 RX ADMIN — KETOROLAC TROMETHAMINE 30 MG: 30 INJECTION, SOLUTION INTRAMUSCULAR; INTRAVENOUS at 07:54

## 2024-05-08 RX ADMIN — SODIUM CHLORIDE, POTASSIUM CHLORIDE, SODIUM LACTATE AND CALCIUM CHLORIDE: 600; 310; 30; 20 INJECTION, SOLUTION INTRAVENOUS at 06:36

## 2024-05-08 RX ADMIN — ONDANSETRON 4 MG: 2 INJECTION INTRAMUSCULAR; INTRAVENOUS at 07:32

## 2024-05-08 RX ADMIN — OXYCODONE HYDROCHLORIDE AND ACETAMINOPHEN 1 TABLET: 5; 325 TABLET ORAL at 08:48

## 2024-05-08 RX ADMIN — LIDOCAINE HYDROCHLORIDE 40 MG: 10 INJECTION, SOLUTION INFILTRATION; PERINEURAL at 07:27

## 2024-05-08 RX ADMIN — HYDROMORPHONE HYDROCHLORIDE 0.5 MG: 1 INJECTION, SOLUTION INTRAMUSCULAR; INTRAVENOUS; SUBCUTANEOUS at 08:43

## 2024-05-08 RX ADMIN — PROPOFOL 150 MG: 10 INJECTION, EMULSION INTRAVENOUS at 07:27

## 2024-05-08 ASSESSMENT — PAIN DESCRIPTION - DESCRIPTORS
DESCRIPTORS: BURNING

## 2024-05-08 ASSESSMENT — PAIN DESCRIPTION - LOCATION
LOCATION: VAGINA

## 2024-05-08 ASSESSMENT — PAIN - FUNCTIONAL ASSESSMENT
PAIN_FUNCTIONAL_ASSESSMENT: NONE - DENIES PAIN
PAIN_FUNCTIONAL_ASSESSMENT: 0-10

## 2024-05-08 ASSESSMENT — PAIN SCALES - GENERAL
PAINLEVEL_OUTOF10: 4
PAINLEVEL_OUTOF10: 7
PAINLEVEL_OUTOF10: 9

## 2024-05-08 NOTE — BRIEF OP NOTE
Brief Operative Note  UroGynecology  [facility]     Patient: Sarah Martin   : 1960  MRN: [id number]       Acct: [id number]   Date of Procedure: 24     Pre-operative Diagnosis: 63 y.o. female   Vulvar dysplasia  Cervical dysplasia  Postmenopausal bleeding    Post-operative Diagnosis:   Vulvar dysplasia  Cervical dysplasia  Postmenopausal bleeding    Procedure: LEEP, hysteroscopy, dilation and curettage, vulvar lesion excision    Surgeon: Dr. Childress     Assistant(s): Charmaine Sung DO, PGY4; Yolanda Mendez DO, PGY1    Anesthesia: General    Findings:  Cervical stenosis, decreased uptake of Lugol's iodine at posterior ectocervix, atrophic endometrium with bilateral tubal ostia visualized, left vulvar lesion otherwise normal appearing external genitalia  Total IV fluids/Blood products:  800ml crystalloid  Urine Output:  20 ml    Estimated blood loss:  10mL  Drains:  none  Specimens:  6 o'clock exocervix, cone of endocervix, emdometrial curettings  Instrument and Sponge Count: Correct  Complications:  none  Condition:  good, transferred to post anesthesia recovery      Charmaine Sung DO  UroGyn Resident  2024, 8:06 AM

## 2024-05-08 NOTE — ANESTHESIA POSTPROCEDURE EVALUATION
Department of Anesthesiology  Postprocedure Note    Patient: Sarah Martin  MRN: [id number]  YOB: 1960  Date of evaluation: 5/8/2024    Procedure Summary       Date: 05/08/24 Room / Location: [address]    Anesthesia Start: 0724 Anesthesia Stop: 0813    Procedures:       HYSTEROSCOPY      DILATATION AND CURETTAGE LEEP Diagnosis:       Postmenopausal bleeding      History of cervical dysplasia      Cyst, vulva      (Postmenopausal bleeding [N95.0])      (History of cervical dysplasia [Z87.410])      (Cyst, vulva [N90.7])    Surgeons: Cliff Childress DO Responsible Provider: Dio Roldan MD    Anesthesia Type: general ASA Status: 3            Anesthesia Type: No value filed.    Evi Phase I: Evi Score: 8    Evi Phase II:      Anesthesia Post Evaluation    Patient location during evaluation: PACU  Patient participation: complete - patient participated  Level of consciousness: awake and alert  Airway patency: patent  Nausea & Vomiting: no nausea and no vomiting  Cardiovascular status: hemodynamically stable  Respiratory status: room air and spontaneous ventilation  Hydration status: euvolemic  Multimodal analgesia pain management approach  Pain management: adequate    No notable events documented.

## 2024-05-08 NOTE — ANESTHESIA PRE PROCEDURE
ROS.         Other Findings:       Anesthesia Plan      general     ASA 3     (LMA)  Induction: intravenous.    MIPS: Postoperative opioids intended and Prophylactic antiemetics administered.  Anesthetic plan and risks discussed with patient.      Plan discussed with CRNA.    Attending anesthesiologist reviewed and agrees with Preprocedure content            JUAN DIEGO FERREIRA MD   5/8/2024

## 2024-05-08 NOTE — OP NOTE
Indication: Persistent cervical dysplasia with LGSIL lesion history of exocervix, cervical stenosis with postmenopausal bleeding, HGSIL lesion of the left vulvar introitus with indeterminant margins.     Preoperative Diagnoses: Same      Postoperative Diagnoses: Same + atrophic appearing endometrium      Procedure: LEEP of exocerix and endocervical stenosis, Hysteroscopy D&C, and wide local excision left vulvar introital lesion      Surgeon: Cliff Childress DO    Assists: Charmaine Sung DO    Anesthesia: MAC    Findings: As above    Specimens: 6 o'clock exocervical LEEP specimen, endocervical LEEP specimen, endometrial currettings, left vulvar introital lesion    EBL: 0 mls    Drains: None    Implants: None    Packing: None    Complications: None    Course:      The patient is being admitted for a planned elective surgical procedure today. The patient has symptoms, physical findings, and diagnostic testing meeting appropriate indications for today's planned procedure. The patient has been educated about their condition, conservative and surgical options have been offered to the patient, and the patient has decided to proceed with a surgical option. In the preoperative holding area prior to the procedure, the operative plan, including risks, benefits and alternatives was reviewed with the patient and any present family member and friend.  An informed consent has been signed under no duress or confusion. The patient has been surgically cleared by their PCP and /or any pertinent specialists as recommended. All pertinent preoperative labs and testing have been reviewed. A pregnancy test was confirmed as negative if of reproductive age with an intact uterus. The patient has satisfactorily completed recommended pre-operative preparations prior to surgery, including MRSA precautions as indicated. The patient took required medicines and nutritional supplements to the best of their ability prior to surgery with a sip of  exception of aggressive anticoagulants. The patient understands how to contact my service with any problems or questions.

## 2024-05-10 LAB — SURGICAL PATHOLOGY REPORT: NORMAL

## 2024-05-16 ENCOUNTER — TRANSCRIBE ORDERS (OUTPATIENT)
Dept: ULTRASOUND IMAGING | Age: 64
End: 2024-05-16

## 2024-05-16 DIAGNOSIS — R91.1 COIN LESION: Primary | ICD-10-CM

## 2024-05-17 ENCOUNTER — HOSPITAL ENCOUNTER (OUTPATIENT)
Dept: CT IMAGING | Age: 64
End: 2024-05-17
Payer: MEDICARE

## 2024-05-17 ENCOUNTER — OFFICE VISIT (OUTPATIENT)
Age: 64
End: 2024-05-17
Payer: MEDICARE

## 2024-05-17 VITALS
DIASTOLIC BLOOD PRESSURE: 56 MMHG | WEIGHT: 213 LBS | RESPIRATION RATE: 20 BRPM | HEART RATE: 87 BPM | BODY MASS INDEX: 37.73 KG/M2 | SYSTOLIC BLOOD PRESSURE: 132 MMHG

## 2024-05-17 DIAGNOSIS — R91.1 COIN LESION: ICD-10-CM

## 2024-05-17 DIAGNOSIS — N90.89 VULVAR LESION: ICD-10-CM

## 2024-05-17 DIAGNOSIS — E66.09 CLASS 2 OBESITY DUE TO EXCESS CALORIES WITHOUT SERIOUS COMORBIDITY WITH BODY MASS INDEX (BMI) OF 37.0 TO 37.9 IN ADULT: ICD-10-CM

## 2024-05-17 DIAGNOSIS — J44.9 STAGE 3 SEVERE COPD BY GOLD CLASSIFICATION (HCC): Primary | ICD-10-CM

## 2024-05-17 DIAGNOSIS — Z98.890 STATUS POST EXCISIONAL BIOPSY: ICD-10-CM

## 2024-05-17 DIAGNOSIS — Z87.891 FORMER SMOKER: ICD-10-CM

## 2024-05-17 DIAGNOSIS — M85.80 OSTEOPENIA DETERMINED BY X-RAY: ICD-10-CM

## 2024-05-17 PROCEDURE — G8400 PT W/DXA NO RESULTS DOC: HCPCS | Performed by: STUDENT IN AN ORGANIZED HEALTH CARE EDUCATION/TRAINING PROGRAM

## 2024-05-17 PROCEDURE — 3023F SPIROM DOC REV: CPT | Performed by: STUDENT IN AN ORGANIZED HEALTH CARE EDUCATION/TRAINING PROGRAM

## 2024-05-17 PROCEDURE — 71250 CT THORAX DX C-: CPT

## 2024-05-17 PROCEDURE — 1123F ACP DISCUSS/DSCN MKR DOCD: CPT | Performed by: STUDENT IN AN ORGANIZED HEALTH CARE EDUCATION/TRAINING PROGRAM

## 2024-05-17 PROCEDURE — 3017F COLORECTAL CA SCREEN DOC REV: CPT | Performed by: STUDENT IN AN ORGANIZED HEALTH CARE EDUCATION/TRAINING PROGRAM

## 2024-05-17 PROCEDURE — 1090F PRES/ABSN URINE INCON ASSESS: CPT | Performed by: STUDENT IN AN ORGANIZED HEALTH CARE EDUCATION/TRAINING PROGRAM

## 2024-05-17 PROCEDURE — 99213 OFFICE O/P EST LOW 20 MIN: CPT | Performed by: STUDENT IN AN ORGANIZED HEALTH CARE EDUCATION/TRAINING PROGRAM

## 2024-05-17 PROCEDURE — G8417 CALC BMI ABV UP PARAM F/U: HCPCS | Performed by: STUDENT IN AN ORGANIZED HEALTH CARE EDUCATION/TRAINING PROGRAM

## 2024-05-17 PROCEDURE — G8427 DOCREV CUR MEDS BY ELIG CLIN: HCPCS | Performed by: STUDENT IN AN ORGANIZED HEALTH CARE EDUCATION/TRAINING PROGRAM

## 2024-05-17 PROCEDURE — 99211 OFF/OP EST MAY X REQ PHY/QHP: CPT | Performed by: STUDENT IN AN ORGANIZED HEALTH CARE EDUCATION/TRAINING PROGRAM

## 2024-05-17 PROCEDURE — 1036F TOBACCO NON-USER: CPT | Performed by: STUDENT IN AN ORGANIZED HEALTH CARE EDUCATION/TRAINING PROGRAM

## 2024-05-17 RX ORDER — AZELASTINE HYDROCHLORIDE, FLUTICASONE PROPIONATE 137; 50 UG/1; UG/1
SPRAY, METERED NASAL
COMMUNITY

## 2024-05-17 NOTE — PROGRESS NOTES
[facility]  [address] [place] [address]  Phone: [phone]  Fax: [phone]      Date of Visit: 24  Patient Name: Sarah Martin   Patient :  1960     ASSESSMENT/PLAN   1. Stage 3 severe COPD by GOLD classification ([facility])  Assessment & Plan:  - recent PFTs indicate Stage II/III COPD, so problem list updated  - pt following with Dr. Burgos. Reports breathing at baseline, no chest tightness  - pt requests handicap placard; last prescribed by Dr. Latif in 2019. Rx printed today.  Orders:  -     Handicap Placard MISC; Starting Fri 2024, Disp-1 each, R-0, PrintIt is my medical opinion that Sarah Martin requires a disability parking placard for the following reasons: She is restricted by lung disease, seeing pulmonologist and diagnosed with GOLD stage III COPD. Duration of need: 5 years.  2. Vulvar lesion  3. Status post excisional biopsy  4. Osteopenia determined by x-ray  Assessment & Plan:  - pt had questions about medications for weight loss. pt had questions about adipex, but advised that medication could worsen issues with bone density.   5. Class 2 obesity due to excess calories without serious comorbidity with body mass index (BMI) of 37.0 to 37.9 in adult  Comments:  - pt will continue increasing physical activity as tolerate. discussed making dietary changes in a manner that encourages habit formation.  6. Former smoker       Return in about 3 months (around 2024) for routine follow-up.    HPI    Sarah Martin is a 63 y.o. female who presents today to discuss   Chief Complaint   Patient presents with    Medication Refill     Handicap Place card script      During last hospitalization, pt was given referral to Cardiology for further workup of unspecified chest pain. Referral generated to Dr. Le's office, but patient has not been to see cardiology yet.  Reports she has been dealing with her urogyn

## 2024-05-17 NOTE — PATIENT INSTRUCTIONS
Thank you for following up with us at [facility] office! It was a pleasure to meet you today!     Our plan is the following:  - Make sure you talk to Dr. Childress about the discomfort you are experiencing. I'm glad you are feeling better, but I still want you to make sure you bring it up.     - You are due for your yearly Medicare Annual Wellness visit. At this visit, your known health issues are not discussed. Instead, the point of this visit is to make sure we are keeping you as healthy as possible and are best preventing future health issues. This visit is free for you and is done here in our office.    - I will be graduating at the end of June, but I plan on moving to [facility] ([address] [place], [place] [id number]. Phone number [phone]). If you would like for me to continue being your family physician, I can continue seeing you at that location. If not, you will be reassigned to another resident in the program by July.     Your medication list is included in the after visit summary. If you find any differences when compared to your medications at home, please contact the office ([phone]) to let us know.   You can also call the office if you have any additional questions or concerns and speak to one of the staff. They will triage and forward the message to the provider.   Have a great rest of your day!

## 2024-05-17 NOTE — ASSESSMENT & PLAN NOTE
- pt had questions about medications for weight loss. pt had questions about adipex, but advised that medication could worsen issues with bone density.

## 2024-05-17 NOTE — ASSESSMENT & PLAN NOTE
- recent PFTs indicate Stage II/III COPD, so problem list updated  - pt following with Dr. Burgos. Reports breathing at baseline, no chest tightness  - pt requests handicap placard; last prescribed by Dr. Latif in 2019. Rx printed today.

## 2024-05-20 ENCOUNTER — OFFICE VISIT (OUTPATIENT)
Age: 64
End: 2024-05-20
Payer: MEDICARE

## 2024-05-20 VITALS
BODY MASS INDEX: 37.21 KG/M2 | WEIGHT: 210 LBS | TEMPERATURE: 97.6 F | DIASTOLIC BLOOD PRESSURE: 72 MMHG | HEART RATE: 94 BPM | HEIGHT: 63 IN | OXYGEN SATURATION: 98 % | SYSTOLIC BLOOD PRESSURE: 124 MMHG

## 2024-05-20 DIAGNOSIS — N87.0 CIN I (CERVICAL INTRAEPITHELIAL NEOPLASIA I): ICD-10-CM

## 2024-05-20 DIAGNOSIS — N90.0 VIN I (VULVAR INTRAEPITHELIAL NEOPLASIA I): Primary | ICD-10-CM

## 2024-05-20 LAB
BILIRUBIN, POC: NORMAL
BLOOD URINE, POC: 250
CLARITY, POC: NORMAL
COLOR, POC: NORMAL
GLUCOSE URINE, POC: NORMAL
KETONES, POC: 15
LEUKOCYTE EST, POC: 500
NITRITE, POC: NORMAL
PH, POC: 5
PROTEIN, POC: 30
SPECIFIC GRAVITY, POC: 1.02
UROBILINOGEN, POC: 1

## 2024-05-20 PROCEDURE — 81002 URINALYSIS NONAUTO W/O SCOPE: CPT | Performed by: NURSE PRACTITIONER

## 2024-05-20 PROCEDURE — 99024 POSTOP FOLLOW-UP VISIT: CPT | Performed by: NURSE PRACTITIONER

## 2024-05-20 NOTE — PROGRESS NOTES
[facility], [facility] UROGYNECOLOGY AND PELVIC REHABILITATION   [address] [place] [address]  [address] [place] [address]  Dept: [phone]   Patient:  Sarah Martin   :  1960   Visit Date:  2024     CC: 1-2 week postoperative exam.     HPI: Pt feeling well, minimal tendeness at introitus, minimal vaginal discharge, no odor. No concerns. Pt attempted to ride bike and noted soreness, plans to not attempt again until completely healed.    Status post: Hysteroscopy and Dilatation And Curettage Leep   Date: 2024   Significant findings: A.  6:00 EXOCERVIX, BIOPSY:   -FRAGMENTED SQUAMOUS MUCOSA WITH LOW-GRADE SQUAMOUS INTRAEPITHELIAL   LESION (YVAN-1).   -BACKGROUND ACUTE AND CHRONIC INFLAMMATION.   -NO ENDOCERVICAL GLANDS PRESENT.     B.  ENDOCERVIX, LEEP CONE EXCISION:   -BENIGN FIBROMUSCULAR TISSUE WITH FOCAL FIBROSIS WITH HEMOSIDERIN   LADEN MACROPHAGES AND GIANT CELL REACTION (CONSISTENT WITH PREVIOUS   BIOPSY SITE).   -FOCAL BENIGN ENDOCERVICAL GLAND WITH ASSOCIATED CHRONIC INFLAMMATION.     C.  ENDOMETRIUM, CURETTINGS:   -SCANT FRAGMENTS OF BENIGN ENDOCERVICAL GLANDULAR EPITHELIUM AND   MUCOID MATERIAL WITH INFLAMMATORY CELLS.   -NO ENDOMETRIAL TISSUES PRESENT.     D.  LEFT VULVA, EXCISION:   -SQUAMOUS MUCOSA WITH CHRONIC ACTIVE ULCERATION AND ADJACENT LOW-GRADE   SQUAMOUS INTRAEPITHELIAL LESION (THEODORA 1).   -MARGINS APPEAR NEGATIVE IN SECTIONS EXAMINED.       This SmartLink has not been configured with any valid records.      /72 (Site: Left Upper Arm, Position: Sitting, Cuff Size: Large Adult)   Pulse 94   Temp 97.6 °F (36.4 °C)   Ht 1.6 m (5' 3\")   Wt 95.3 kg (210 lb)   SpO2 98%   BMI 37.20 kg/m²    Vitals and signs or symptoms of UTI or infection were assessed. The presence of fevers, nausea, vomiting, chest pain, shortness of breath, or calf pain were evaluated.  Postoperative pain was evaluated.  Bowel and bladder habits were

## 2024-10-09 ENCOUNTER — TELEPHONE (OUTPATIENT)
Age: 64
End: 2024-10-09

## 2024-10-09 DIAGNOSIS — Z12.31 ENCOUNTER FOR SCREENING MAMMOGRAM FOR MALIGNANT NEOPLASM OF BREAST: Primary | ICD-10-CM

## 2024-11-14 NOTE — PROGRESS NOTES
[facility], [facility] UROGYNECOLOGY AND PELVIC REHABILITATION   [address] [place] [address]  [address] [place] [address]  Dept: [phone]   Patient:  Sarah Martin   :  1960   Visit Date:  2024     OFFICE PROCEDURES - COLPOSCOPY  CC: Patient presents for colposcopy. had concerns including Procedure (Patient states she feel like she is getting and UTI) and Colposcopy.     HPI: Pt presents for follow up, colposcopy. Risks/benefits reviewed, consent signed.  Vaginal bleeding light pinkish noted with intercourse one month ago, no bleeding with second episode of intercourse since surgery in May. Noting vaginal dryness/discomfort, no abnormal discharge. Using VET twice weekly.   Pt reports low back pain into kidneys noted Wednesday of last week, increased urinary frequency, no increased urgency/dysuria. She was feeling feverish/chills before Started Macrobid on Saturday, has had four doses. Back pain has eased a bit with macrobid, less frequency. She has increased water, no history of kidney stone.    Status post: Hysteroscopy and Dilatation And Curettage Leep   Date: 2024   Significant findings: A.  6:00 EXOCERVIX, BIOPSY:   -FRAGMENTED SQUAMOUS MUCOSA WITH LOW-GRADE SQUAMOUS INTRAEPITHELIAL   LESION (YVAN-1).   -BACKGROUND ACUTE AND CHRONIC INFLAMMATION.   -NO ENDOCERVICAL GLANDS PRESENT.     B.  ENDOCERVIX, LEEP CONE EXCISION:   -BENIGN FIBROMUSCULAR TISSUE WITH FOCAL FIBROSIS WITH HEMOSIDERIN   LADEN MACROPHAGES AND GIANT CELL REACTION (CONSISTENT WITH PREVIOUS   BIOPSY SITE).   -FOCAL BENIGN ENDOCERVICAL GLAND WITH ASSOCIATED CHRONIC INFLAMMATION.     C.  ENDOMETRIUM, CURETTINGS:   -SCANT FRAGMENTS OF BENIGN ENDOCERVICAL GLANDULAR EPITHELIUM AND   MUCOID MATERIAL WITH INFLAMMATORY CELLS.   -NO ENDOMETRIAL TISSUES PRESENT.     D.  LEFT VULVA, EXCISION:   -SQUAMOUS MUCOSA WITH CHRONIC ACTIVE ULCERATION AND ADJACENT LOW-GRADE   SQUAMOUS

## 2024-11-18 ENCOUNTER — HOSPITAL ENCOUNTER (OUTPATIENT)
Age: 64
Setting detail: SPECIMEN
Discharge: HOME | End: 2024-11-18

## 2024-11-18 ENCOUNTER — PROCEDURE VISIT (OUTPATIENT)
Age: 64
End: 2024-11-18

## 2024-11-18 VITALS
OXYGEN SATURATION: 94 % | TEMPERATURE: 97.4 F | SYSTOLIC BLOOD PRESSURE: 132 MMHG | HEART RATE: 86 BPM | DIASTOLIC BLOOD PRESSURE: 76 MMHG

## 2024-11-18 DIAGNOSIS — R31.29 MICROHEMATURIA: ICD-10-CM

## 2024-11-18 DIAGNOSIS — R35.0 URINARY FREQUENCY: ICD-10-CM

## 2024-11-18 DIAGNOSIS — M54.50 ACUTE BILATERAL LOW BACK PAIN WITHOUT SCIATICA: ICD-10-CM

## 2024-11-18 DIAGNOSIS — R39.9 UTI SYMPTOMS: ICD-10-CM

## 2024-11-18 DIAGNOSIS — N87.0 CIN I (CERVICAL INTRAEPITHELIAL NEOPLASIA I): ICD-10-CM

## 2024-11-18 DIAGNOSIS — N95.0 PMB (POSTMENOPAUSAL BLEEDING): ICD-10-CM

## 2024-11-18 DIAGNOSIS — N90.0 VIN I (VULVAR INTRAEPITHELIAL NEOPLASIA I): Primary | ICD-10-CM

## 2024-11-18 RX ORDER — CIPROFLOXACIN 500 MG/1
500 TABLET, FILM COATED ORAL 2 TIMES DAILY
Qty: 14 TABLET | Refills: 0 | Status: SHIPPED | OUTPATIENT
Start: 2024-11-18 | End: 2024-11-25

## 2024-11-18 ASSESSMENT — ENCOUNTER SYMPTOMS: BACK PAIN: 1

## 2024-11-18 NOTE — PATIENT INSTRUCTIONS
If fever, chills, not feeling well seek ER care for UTI/possible kidney infection/kidney stone  [facility] UROGYNECOLOGY & PELVIC REHABILITATION    Care Following a Vulvar Biopsy, Ablation, or Procedure    The following information will help you make a more comfortable and rapid recovery after your vulvar biopsy, ablation, or procedure.    Discomfort  For a few days after your biopsy, you may have some discomfort in and around the area that was treated.  There are several things you can do to reduce the discomfort and promote healing of the treated area.  ?Soak in a bathtub of plain, warm water for 15-20 minutes two to three times a day.  Another option is to put a disposable compress (such as a clean washcloth or a non-herbal tea bag) under warm water, and then place the compress on the treated area for 5-10 minutes several times a day.    ?You may experience burning in the area that was treated when urinating.  Pouring plain, warm water over the area while urinating will dilute the urine making it less painful.  You also can urinate while showering or bathing in the bathtub.  ?You may take acetaminophen or ibuprofen in the recommended dose as needed for discomfort.  ?You may apply any topical ointments or creams prescribed as directed.     Discharge  Expect to have spotting or discharge for two to three days after your biopsy.  Wear a pad or panty liner.  If heavy bleeding occurs, apply pressure to the area with a clean washcloth for 5-10 minutes.    Restrictions  Put nothing in your vagina for at least seven days, including tampons and douches.  Refrain from sexual intercourse for seven days.    Activity  Avoid doing activities that cause friction or discomfort to the treated area.  You may gradually resume your normal activities as you feel able.    Bathing  You may take a shower or tub bath at any time.  Rinse the area that was treated with plain, warm water at the end of your shower or bath.  Pat dry the

## 2024-11-19 ENCOUNTER — PATIENT MESSAGE (OUTPATIENT)
Age: 64
End: 2024-11-19

## 2024-11-19 LAB
MICROORGANISM SPEC CULT: NORMAL
SERVICE CMNT-IMP: NORMAL
SPECIMEN DESCRIPTION: NORMAL

## 2024-11-19 NOTE — TELEPHONE ENCOUNTER
Patient having increased pain in low abdomen/ back pain through buttocks. On Cipro empirically for uti symptoms until UC back. UC checked and no growth commented. Patient without flank pain, fever, bloody urine, or foul urine. She read Cipro may cause arthralgias and back pain. Recommended to stop Cipro since UC negative and see if pain improves. Hydrate as well. If not better, patient to call office tomorrow or go to ER. May need to evaluate for a kidney stone with blood in urine and leukocytes. Time of call 10 minutes.

## 2024-11-21 LAB — SURGICAL PATHOLOGY REPORT: NORMAL

## 2024-11-24 DIAGNOSIS — E78.49 OTHER HYPERLIPIDEMIA: ICD-10-CM

## 2024-11-26 RX ORDER — ROSUVASTATIN CALCIUM 5 MG/1
5 TABLET, COATED ORAL DAILY
Qty: 90 TABLET | Refills: 0 | Status: SHIPPED | OUTPATIENT
Start: 2024-11-26 | End: 2024-11-27 | Stop reason: SDUPTHER

## 2024-11-27 ENCOUNTER — PATIENT MESSAGE (OUTPATIENT)
Dept: PRIMARY CARE CLINIC | Age: 64
End: 2024-11-27

## 2024-11-27 DIAGNOSIS — E78.49 OTHER HYPERLIPIDEMIA: ICD-10-CM

## 2024-11-27 RX ORDER — ROSUVASTATIN CALCIUM 5 MG/1
5 TABLET, COATED ORAL DAILY
Qty: 90 TABLET | Refills: 1 | Status: SHIPPED | OUTPATIENT
Start: 2024-11-27

## 2024-11-27 NOTE — TELEPHONE ENCOUNTER
LAST VISIT:   Visit date not found     Future Appointments   Date Time Provider Department Center   12/5/2024  9:30 AM [facility]YN [facility]

## 2024-12-05 RX ORDER — ESTRADIOL 0.1 MG/G
CREAM VAGINAL
Qty: 42.5 G | Refills: 0 | Status: SHIPPED | OUTPATIENT
Start: 2024-12-05

## 2025-01-05 ENCOUNTER — PATIENT MESSAGE (OUTPATIENT)
Age: 65
End: 2025-01-05

## 2025-01-21 ENCOUNTER — TELEPHONE (OUTPATIENT)
Age: 65
End: 2025-01-21

## 2025-01-21 DIAGNOSIS — B37.31 VULVOVAGINITIS DUE TO YEAST: Primary | ICD-10-CM

## 2025-01-21 NOTE — TELEPHONE ENCOUNTER
Pt states that you increased estradiol from 2x weekly to 3x wkly at last visit ( I didn't see that noted in our last note) complains that she has had an increase in  yeast infection 3x since last visit this last one she just finished a 3 day treatment and still does not feel it is completely gone. Pt wants to know if something can be called in.Please advise

## 2025-01-22 RX ORDER — FLUCONAZOLE 150 MG/1
150 TABLET ORAL
Qty: 2 TABLET | Refills: 0 | Status: SHIPPED | OUTPATIENT
Start: 2025-01-22 | End: 2025-01-28

## 2025-01-22 RX ORDER — NYSTATIN AND TRIAMCINOLONE ACETONIDE 100000; 1 [USP'U]/G; MG/G
CREAM TOPICAL 2 TIMES DAILY
Qty: 30 G | Refills: 1 | Status: SHIPPED | OUTPATIENT
Start: 2025-01-22 | End: 2025-02-05

## 2025-04-18 SDOH — HEALTH STABILITY: PHYSICAL HEALTH: ON AVERAGE, HOW MANY MINUTES DO YOU ENGAGE IN EXERCISE AT THIS LEVEL?: 30 MIN

## 2025-04-18 SDOH — HEALTH STABILITY: PHYSICAL HEALTH: ON AVERAGE, HOW MANY DAYS PER WEEK DO YOU ENGAGE IN MODERATE TO STRENUOUS EXERCISE (LIKE A BRISK WALK)?: 3 DAYS

## 2025-04-21 ENCOUNTER — OFFICE VISIT (OUTPATIENT)
Dept: PRIMARY CARE CLINIC | Age: 65
End: 2025-04-21

## 2025-04-21 VITALS
BODY MASS INDEX: 36.68 KG/M2 | OXYGEN SATURATION: 92 % | SYSTOLIC BLOOD PRESSURE: 126 MMHG | HEIGHT: 63 IN | HEART RATE: 96 BPM | DIASTOLIC BLOOD PRESSURE: 84 MMHG | WEIGHT: 207 LBS

## 2025-04-21 DIAGNOSIS — Z13.1 DIABETES MELLITUS SCREENING: ICD-10-CM

## 2025-04-21 DIAGNOSIS — E78.2 MIXED HYPERLIPIDEMIA: ICD-10-CM

## 2025-04-21 DIAGNOSIS — E66.812 CLASS 2 SEVERE OBESITY DUE TO EXCESS CALORIES WITH SERIOUS COMORBIDITY AND BODY MASS INDEX (BMI) OF 36.0 TO 36.9 IN ADULT (HCC): ICD-10-CM

## 2025-04-21 DIAGNOSIS — E66.01 CLASS 2 SEVERE OBESITY DUE TO EXCESS CALORIES WITH SERIOUS COMORBIDITY AND BODY MASS INDEX (BMI) OF 36.0 TO 36.9 IN ADULT (HCC): ICD-10-CM

## 2025-04-21 DIAGNOSIS — L02.32 FURUNCULOSIS OF BUTTOCK: Primary | ICD-10-CM

## 2025-04-21 DIAGNOSIS — J44.9 STAGE 3 SEVERE COPD BY GOLD CLASSIFICATION (HCC): ICD-10-CM

## 2025-04-21 DIAGNOSIS — Z87.891 FORMER SMOKER: ICD-10-CM

## 2025-04-21 DIAGNOSIS — J30.2 SEASONAL ALLERGIES: ICD-10-CM

## 2025-04-21 RX ORDER — AZELASTINE 1 MG/ML
SPRAY, METERED NASAL
COMMUNITY
Start: 2025-04-03

## 2025-04-21 RX ORDER — PREDNISOLONE ACETATE 10 MG/ML
SUSPENSION/ DROPS OPHTHALMIC
COMMUNITY

## 2025-04-21 SDOH — ECONOMIC STABILITY: FOOD INSECURITY: WITHIN THE PAST 12 MONTHS, YOU WORRIED THAT YOUR FOOD WOULD RUN OUT BEFORE YOU GOT MONEY TO BUY MORE.: NEVER TRUE

## 2025-04-21 SDOH — ECONOMIC STABILITY: FOOD INSECURITY: WITHIN THE PAST 12 MONTHS, THE FOOD YOU BOUGHT JUST DIDN'T LAST AND YOU DIDN'T HAVE MONEY TO GET MORE.: NEVER TRUE

## 2025-04-21 ASSESSMENT — PATIENT HEALTH QUESTIONNAIRE - PHQ9
SUM OF ALL RESPONSES TO PHQ QUESTIONS 1-9: 0
2. FEELING DOWN, DEPRESSED OR HOPELESS: NOT AT ALL
SUM OF ALL RESPONSES TO PHQ QUESTIONS 1-9: 0
1. LITTLE INTEREST OR PLEASURE IN DOING THINGS: NOT AT ALL
SUM OF ALL RESPONSES TO PHQ QUESTIONS 1-9: 0
SUM OF ALL RESPONSES TO PHQ QUESTIONS 1-9: 0

## 2025-04-21 NOTE — ASSESSMENT & PLAN NOTE
- lipid panel ordered today- continue current treatment plan unless labs indicate change is necessary

## 2025-04-21 NOTE — PROGRESS NOTES
[facility]  [address] [place] [address].  [place] [address]  Phone: [phone]  Fax: [phone]    Office Visit Note    Date of Visit: 2025   Patient Name: Sarah Martin   Patient :  1960     ASSESSMENT/PLAN     1. Furunculosis of buttock  Comments:  - 3cm in diameter  - pain with prolonged sitting  - no abx started- resource review indicates abx usually held until sample obtained for cx  Orders:  -     AFL - Nikki Villagomez, , General [facility], [place]  2. Mixed hyperlipidemia  Overview:  Currently on Crestor, managed by PCP.  Assessment & Plan:  - lipid panel ordered today- continue current treatment plan unless labs indicate change is necessary  Orders:  -     Lipid, Fasting; Future  3. Stage 3 severe COPD by GOLD classification (HCC)  Overview:  Currently on Symbicort, Singulair, and albuterol prn, managed by Dr. Nestor Burgos.  Assessment & Plan:  - this chronic condition is managed by specialist. No findings today merit changing the current treatment plan.  Orders:  -     CBC with Auto Differential; Future  -     Basic Metabolic Panel; Future  4. Seasonal allergies  Overview:  Currently on Singulair and Benadryl, managed by Dr. Nestor Burgos.  5. Diabetes mellitus screening  Comments:  - a1c ordered today  Orders:  -     Hemoglobin A1C; Future  6. Former smoker  Overview:  Quit . 40.1pkyr history.  7. Class 2 severe obesity due to excess calories with serious comorbidity and body mass index (BMI) of 36.0 to 36.9 in adult      There are no Patient Instructions on file for this visit.     No follow-ups on file.    COMMUNICATION   Questions/concerns answered. Patient verbalized and expressed understanding. Medications, laboratory testing, imaging, consultation, and follow up as documented in this encounter.     The patient (or guardian, if applicable) and other individuals in attendance with the patient were advised that Artificial Intelligence will be utilized during this visit to record,

## 2025-04-21 NOTE — ASSESSMENT & PLAN NOTE
- this chronic condition is managed by specialist. No findings today merit changing the current treatment plan.

## 2025-06-12 ENCOUNTER — TELEPHONE (OUTPATIENT)
Dept: PRIMARY CARE CLINIC | Age: 65
End: 2025-06-12

## 2025-06-13 ENCOUNTER — PATIENT MESSAGE (OUTPATIENT)
Dept: PRIMARY CARE CLINIC | Age: 65
End: 2025-06-13

## 2025-06-23 ENCOUNTER — TELEPHONE (OUTPATIENT)
Dept: PRIMARY CARE CLINIC | Age: 65
End: 2025-06-23

## 2025-07-24 ENCOUNTER — COMMUNITY OUTREACH (OUTPATIENT)
Dept: PRIMARY CARE CLINIC | Age: 65
End: 2025-07-24

## 2025-08-16 DIAGNOSIS — E78.49 OTHER HYPERLIPIDEMIA: ICD-10-CM

## 2025-08-19 RX ORDER — ROSUVASTATIN CALCIUM 5 MG/1
5 TABLET, COATED ORAL DAILY
Qty: 90 TABLET | Refills: 0 | Status: SHIPPED | OUTPATIENT
Start: 2025-08-19

## (undated) DEVICE — MHPB GYN MINOR PACK: Brand: MEDLINE INDUSTRIES, INC.

## (undated) DEVICE — COUNTER NDL 10 COUNT HLD 20 FOAM BLK SGL MAG

## (undated) DEVICE — SOLUTION IRRIG 1000ML 0.9% SOD CHL USP POUR PLAS BTL

## (undated) DEVICE — TUBING, SUCTION, 9/32" X 20', STRAIGHT: Brand: MEDLINE INDUSTRIES, INC.

## (undated) DEVICE — ELECTRODE PT RET AD L9FT HI MOIST COND ADH HYDRGEL CORDED

## (undated) DEVICE — SOLUTION IRRIGATION BAL SALT SOLUTION 500 ML STRL BSS

## (undated) DEVICE — SHEET,DRAPE,70X100,STERILE: Brand: MEDLINE

## (undated) DEVICE — THE MONARCH® "B" CARTRIDGE IS A SINGLE-USE POLYPROPYLENE CARTRIDGE FOR POSTERIOR CHAMBER IOL DELIVERY: Brand: MONARCH® II

## (undated) DEVICE — PENCIL ELECSURG BPLR 18 GA DISP

## (undated) DEVICE — ELECTRODE ES L11CM DIA5MM BALL SHFT RED DISP UTAHLOOP

## (undated) DEVICE — FLUID MGMT SYS FLUENT KIT 6/PK

## (undated) DEVICE — PACK CATARACT SURGI+CARE CUSTOM

## (undated) DEVICE — NEPTUNE E-SEP SMOKE EVACUATION PENCIL, COATED, 70MM BLADE, PUSH BUTTON SWITCH: Brand: NEPTUNE E-SEP

## (undated) DEVICE — SMOKE EVACUATION TUBING WITH 7/8 IN TO 1/4 IN REDUCER: Brand: BUFFALO FILTER

## (undated) DEVICE — GOWN,AURORA,NONREINFORCED,LARGE: Brand: MEDLINE

## (undated) DEVICE — CONTAINER,SPECIMEN,4OZ,OR STRL: Brand: MEDLINE

## (undated) DEVICE — SOLUTION SCRB 4OZ 2% CHG FOR SURG SCRBBING HND WSH

## (undated) DEVICE — GOWN,SIRUS,NONRNF,XLN/XL,20/CS: Brand: MEDLINE

## (undated) DEVICE — INTENDED FOR TISSUE SEPARATION, AND OTHER PROCEDURES THAT REQUIRE A SHARP SURGICAL BLADE TO PUNCTURE OR CUT.: Brand: BARD-PARKER ® CARBON RIB-BACK BLADES

## (undated) DEVICE — SYRINGE,CONTROL,LL,FINGER,GRIP: Brand: MEDLINE INDUSTRIES, INC.

## (undated) DEVICE — ELECTRODE ARTHSCP W10MM D10MM SHFT 11CM YEL MPLR LOOP W/

## (undated) DEVICE — SWABSTICK MEDICATED 10% POVIDONE IOD PVP TRIPE ANTISEP PREP 3 PER PK

## (undated) DEVICE — TUBING WITH WAND

## (undated) DEVICE — STRAP RESTRAIN W3.5XL19IN TECLIN STRRP POS LEG DURING LITH

## (undated) DEVICE — SUTURE PERMA-HAND SZ 2-0 L30IN NONABSORBABLE BLK L26MM SH K833H

## (undated) DEVICE — MARKER,SKIN,WI/RULER AND LABELS: Brand: MEDLINE

## (undated) DEVICE — DRAPE,UNDRBUT,WHT GRAD PCH,CAPPORT,20/CS: Brand: MEDLINE

## (undated) DEVICE — SUTURE VICRYL COAT SZ 4-0 L18IN ABSRB UD L19MM PS-2 1/2 CIR J496G

## (undated) DEVICE — CORD,CAUTERY,BIPOLAR,STERILE: Brand: MEDLINE

## (undated) DEVICE — SYRINGE MEDICAL 3ML CLEAR PLASTIC STANDARD NON CONTROL LUERLOCK TIP DISPOSABLE

## (undated) DEVICE — CLEARCUT® SLIT KNIFE DUAL BEVEL 2.75MM ANGLED: Brand: CLEARCUT®

## (undated) DEVICE — SYRINGE MED 10ML TRNSLUC BRL PLUNG BLK MRK POLYPR CTRL

## (undated) DEVICE — SET ENDOSCP SEAL HYSTEROSCOPE RIG OUTFLO CHN DISP MYOSURE

## (undated) DEVICE — GLOVE ORANGE PI 7 1/2   MSG9075

## (undated) DEVICE — STRAP,POSITIONING,KNEE/BODY,FOAM,4X60": Brand: MEDLINE

## (undated) DEVICE — TOWEL,OR,DSP,ST,WHITE,DLX,2/PK,40PK/CS: Brand: MEDLINE

## (undated) DEVICE — CONTROL SYRINGE LUER-LOCK TIP: Brand: MONOJECT

## (undated) DEVICE — UNDERPANTS MAT L XL SEAMLESS CLR CODE WAISTBAND KNIT

## (undated) DEVICE — PAD,SANITARY,11 IN,MAXI,W/WINGS,N-STRL: Brand: MEDLINE

## (undated) DEVICE — GOWN,SIRUS,NONRNF,SETINSLV,XL,20/CS: Brand: MEDLINE

## (undated) DEVICE — PLUMEPORT SEO LAPAROSCOPIC SMOKE FILTRATION DEVICE: Brand: PLUMEPORT

## (undated) DEVICE — GLOVE SURG SZ 75 CRM LTX FREE POLYISOPRENE POLYMER BEAD ANTI

## (undated) DEVICE — YANKAUER,FLEXIBLE HANDLE,REGLR CAPACITY: Brand: MEDLINE INDUSTRIES, INC.

## (undated) DEVICE — APPLICATOR COTTON TIP STRL 5/PK

## (undated) DEVICE — MICROSURGICAL INSTRUMENT ANTERIOR CHAMBER CANNULA 27GA: Brand: ALCON

## (undated) DEVICE — SATINCRESCENT® KNIFE ANGLED BEVEL UP: Brand: SATINCRESCENT®

## (undated) DEVICE — ELECTRODE ARTHSCP 15X11MM SHFT 11CM MPLR LOOP GRN DISP W/